# Patient Record
Sex: FEMALE | Race: OTHER | Employment: FULL TIME | URBAN - METROPOLITAN AREA
[De-identification: names, ages, dates, MRNs, and addresses within clinical notes are randomized per-mention and may not be internally consistent; named-entity substitution may affect disease eponyms.]

---

## 2018-09-06 ENCOUNTER — OFFICE VISIT (OUTPATIENT)
Dept: PRIMARY CARE CLINIC | Age: 38
End: 2018-09-06

## 2018-09-06 VITALS
TEMPERATURE: 98 F | BODY MASS INDEX: 30.02 KG/M2 | RESPIRATION RATE: 14 BRPM | DIASTOLIC BLOOD PRESSURE: 70 MMHG | HEIGHT: 58 IN | SYSTOLIC BLOOD PRESSURE: 100 MMHG | HEART RATE: 75 BPM | WEIGHT: 143 LBS

## 2018-09-06 DIAGNOSIS — S16.1XXA CERVICAL MUSCLE STRAIN, INITIAL ENCOUNTER: Primary | ICD-10-CM

## 2018-09-06 DIAGNOSIS — M54.10 RADICULOPATHY AFFECTING UPPER EXTREMITY: ICD-10-CM

## 2018-09-06 PROCEDURE — 99213 OFFICE O/P EST LOW 20 MIN: CPT | Performed by: NURSE PRACTITIONER

## 2018-09-06 RX ORDER — METHOCARBAMOL 750 MG/1
750 TABLET, FILM COATED ORAL NIGHTLY PRN
Qty: 15 TABLET | Refills: 0 | Status: SHIPPED | OUTPATIENT
Start: 2018-09-06 | End: 2018-09-16

## 2018-09-06 RX ORDER — METHYLPREDNISOLONE 4 MG/1
TABLET ORAL
Qty: 1 KIT | Refills: 0 | Status: SHIPPED | OUTPATIENT
Start: 2018-09-06 | End: 2018-09-12

## 2018-09-06 ASSESSMENT — PATIENT HEALTH QUESTIONNAIRE - PHQ9
SUM OF ALL RESPONSES TO PHQ QUESTIONS 1-9: 0
SUM OF ALL RESPONSES TO PHQ QUESTIONS 1-9: 0
1. LITTLE INTEREST OR PLEASURE IN DOING THINGS: 0
2. FEELING DOWN, DEPRESSED OR HOPELESS: 0
SUM OF ALL RESPONSES TO PHQ9 QUESTIONS 1 & 2: 0

## 2018-09-06 ASSESSMENT — ENCOUNTER SYMPTOMS
CHEST TIGHTNESS: 0
TROUBLE SWALLOWING: 0

## 2018-09-06 NOTE — PROGRESS NOTES
9/6/2018    Chief Complaint   Patient presents with    Other     Pt c/o swollen neck and right hand/wrist pain x2 weeks and getting worse. .pt states gets burning sensation on right hand as well. .some headaches. Lázaro Cedeno HPI:  Roberth Cedeno is here for complaints of pain to upper back/neck and radiates into right upper arm. She is reporting increase in headaches to posterior part of head. Burning sensation to right hand, and RUE feels \"heavy\" no weakness to extremity. States that symptoms have been occuring for approx 2 weeks and not improving. Used OTC Ibuprofen and massage without changes to symptoms. She is consistently pushing boxes 25lbs at work in cold/refrigerated environment. [employer] Speaking and Hayden Gonsalez MA at bedside interpreting     Review of Systems   Constitutional: Negative for fatigue and fever. HENT: Negative for trouble swallowing. Respiratory: Negative for chest tightness. Cardiovascular: Negative for chest pain and palpitations. Musculoskeletal: Positive for neck pain. Neurological: Positive for numbness and headaches. Negative for dizziness, weakness and light-headedness. No Known Allergies  Prior to Visit Medications    Medication Sig Taking? Authorizing Provider   methylPREDNISolone (MEDROL DOSEPACK) 4 MG tablet Take by mouth. Yes Frutoso Rad, APRN - CNP   methocarbamol (ROBAXIN-750) 750 MG tablet Take 1 tablet by mouth nightly as needed (muscle pain) Yes Frutoso Rad, APRN - CNP   medroxyPROGESTERone (DEPO-PROVERA) 150 MG/ML injection Inject 150 mg into the muscle every 3 months Yes Historical Provider, MD   ibuprofen (ADVIL;MOTRIN) 200 MG tablet Take 200 mg by mouth every 6 hours as needed for Pain. Yes Historical Provider, MD   butalbital-acetaminophen-caffeine (FIORICET, ESGIC) -40 MG per tablet Take 1 tablet by mouth every 4 hours as needed for Pain  Historical Provider, MD   esomeprazole Magnesium (NEXIUM) 40 MG PACK Take 40 mg by mouth daily.   Historical Provider, MD

## 2018-09-17 VITALS
DIASTOLIC BLOOD PRESSURE: 70 MMHG | WEIGHT: 144 LBS | BODY MASS INDEX: 30.23 KG/M2 | TEMPERATURE: 97.8 F | RESPIRATION RATE: 12 BRPM | SYSTOLIC BLOOD PRESSURE: 110 MMHG | HEART RATE: 76 BPM | HEIGHT: 58 IN

## 2018-09-20 ENCOUNTER — OFFICE VISIT (OUTPATIENT)
Dept: PRIMARY CARE CLINIC | Age: 38
End: 2018-09-20

## 2018-09-20 VITALS
DIASTOLIC BLOOD PRESSURE: 70 MMHG | WEIGHT: 143 LBS | RESPIRATION RATE: 12 BRPM | BODY MASS INDEX: 30.02 KG/M2 | SYSTOLIC BLOOD PRESSURE: 110 MMHG | HEIGHT: 58 IN | HEART RATE: 68 BPM

## 2018-09-20 DIAGNOSIS — S16.1XXA CERVICAL MUSCLE STRAIN, INITIAL ENCOUNTER: ICD-10-CM

## 2018-09-20 DIAGNOSIS — M54.10 RADICULOPATHY AFFECTING UPPER EXTREMITY: ICD-10-CM

## 2018-09-20 DIAGNOSIS — Z01.419 WELL WOMAN EXAM WITH ROUTINE GYNECOLOGICAL EXAM: Primary | ICD-10-CM

## 2018-09-20 DIAGNOSIS — Z12.4 CERVICAL CANCER SCREENING: ICD-10-CM

## 2018-09-20 DIAGNOSIS — K59.00 CONSTIPATION, UNSPECIFIED CONSTIPATION TYPE: ICD-10-CM

## 2018-09-20 PROCEDURE — 99395 PREV VISIT EST AGE 18-39: CPT | Performed by: NURSE PRACTITIONER

## 2018-09-20 RX ORDER — NAPROXEN 500 MG/1
500 TABLET ORAL 2 TIMES DAILY WITH MEALS
Qty: 60 TABLET | Refills: 1 | Status: SHIPPED | OUTPATIENT
Start: 2018-09-20 | End: 2020-03-26 | Stop reason: ALTCHOICE

## 2018-09-20 RX ORDER — METHOCARBAMOL 750 MG/1
750 TABLET, FILM COATED ORAL NIGHTLY PRN
COMMUNITY
End: 2019-07-31

## 2018-09-20 ASSESSMENT — ENCOUNTER SYMPTOMS
DIARRHEA: 0
COUGH: 0
NAUSEA: 0
SHORTNESS OF BREATH: 0
ABDOMINAL PAIN: 0
TROUBLE SWALLOWING: 0
CHEST TIGHTNESS: 0

## 2018-09-20 NOTE — PROGRESS NOTES
strain. Diagnoses and all orders for this visit:    Well woman exam with routine gynecological exam  -     PAP SMEAR with HPV  Reinforced monthly self breast exams, exercise and diet modifications. 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 Reviewed and/or ordered clinical lab results Yes  Reviewed and/or ordered radiology tests No  Reviewed and/or ordered other diagnostic tests No  Discussed test results with performing physician No  Independently reviewed image, tracing, or specimen No  Made a decision to obtain old records No  Reviewed and summarized old records Yes      Charly Pleitez was counseled regarding symptoms of current diagnosis, course and complications of disease if inadequately treated, side effects of medications, diagnosis, treatment options, and prognosis, risks, benefits, complications, and alternatives of treatment, labs, imaging and other studies and treatment targets and goals. She understands instructions and counseling. This dictation was performed with a verbal recognition program (DRAGON) and it was checked for errors. It is possible that there are still dictated errors within this office note. If so, please bring any errors to my attention for an addendum. All efforts were made to ensure that this office note is accurate.

## 2018-09-20 NOTE — LETTER
[address] [place] [id number]  Phone: [phone]  Fax: [phone]    Sinan Florence, PORFIRIO Hook CNP        September 20, 2018     Patient: Shaina Lawson   YOB: 1980   Date of Visit: 9/20/2018       To Whom It May Concern: It is my medical opinion that Alfonso Argueta may return to work on 9/20/18 with the following restrictions: lifting/carrying not to exceed 15 lbs. 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 If you have any questions or concerns, please don't hesitate to call.     Sincerely,          Sinan Florence, PORFIRIO Hook CNP

## 2018-09-24 LAB
HPV COMMENT: NORMAL
HPV TYPE 16: NOT DETECTED
HPV TYPE 18: NOT DETECTED
HPVOH (OTHER TYPES): NOT DETECTED

## 2019-07-31 ENCOUNTER — OFFICE VISIT (OUTPATIENT)
Dept: PRIMARY CARE CLINIC | Age: 39
End: 2019-07-31
Payer: COMMERCIAL

## 2019-07-31 VITALS
OXYGEN SATURATION: 97 % | DIASTOLIC BLOOD PRESSURE: 70 MMHG | SYSTOLIC BLOOD PRESSURE: 122 MMHG | TEMPERATURE: 98.2 F | WEIGHT: 147 LBS | BODY MASS INDEX: 30.72 KG/M2 | HEART RATE: 76 BPM

## 2019-07-31 DIAGNOSIS — H00.15 CHALAZION OF LEFT LOWER EYELID: ICD-10-CM

## 2019-07-31 DIAGNOSIS — G89.29 CHRONIC RIGHT SHOULDER PAIN: Primary | ICD-10-CM

## 2019-07-31 DIAGNOSIS — M25.511 CHRONIC RIGHT SHOULDER PAIN: Primary | ICD-10-CM

## 2019-07-31 PROCEDURE — 99213 OFFICE O/P EST LOW 20 MIN: CPT | Performed by: INTERNAL MEDICINE

## 2019-07-31 RX ORDER — SULFACETAMIDE SODIUM 100 MG/ML
2 SOLUTION/ DROPS OPHTHALMIC 4 TIMES DAILY
Qty: 1 BOTTLE | Refills: 0 | Status: SHIPPED | OUTPATIENT
Start: 2019-07-31 | End: 2019-08-10

## 2020-01-13 ENCOUNTER — TELEPHONE (OUTPATIENT)
Dept: PRIMARY CARE CLINIC | Age: 40
End: 2020-01-13

## 2020-01-17 DIAGNOSIS — Z00.00 WELL ADULT HEALTH CHECK: ICD-10-CM

## 2020-01-17 LAB
A/G RATIO: 1.7 (ref 1.1–2.2)
ALBUMIN SERPL-MCNC: 4.3 G/DL (ref 3.4–5)
ALP BLD-CCNC: 81 U/L (ref 40–129)
ALT SERPL-CCNC: 32 U/L (ref 10–40)
ANION GAP SERPL CALCULATED.3IONS-SCNC: 17 MMOL/L (ref 3–16)
AST SERPL-CCNC: 22 U/L (ref 15–37)
BASOPHILS ABSOLUTE: 0 K/UL (ref 0–0.2)
BASOPHILS RELATIVE PERCENT: 0.4 %
BILIRUB SERPL-MCNC: 0.4 MG/DL (ref 0–1)
BUN BLDV-MCNC: 11 MG/DL (ref 7–20)
CALCIUM SERPL-MCNC: 9.2 MG/DL (ref 8.3–10.6)
CHLORIDE BLD-SCNC: 103 MMOL/L (ref 99–110)
CHOLESTEROL, FASTING: 198 MG/DL (ref 0–199)
CO2: 22 MMOL/L (ref 21–32)
CREAT SERPL-MCNC: <0.5 MG/DL (ref 0.6–1.1)
EOSINOPHILS ABSOLUTE: 0.1 K/UL (ref 0–0.6)
EOSINOPHILS RELATIVE PERCENT: 1 %
GFR AFRICAN AMERICAN: >60
GFR NON-AFRICAN AMERICAN: >60
GLOBULIN: 2.6 G/DL
GLUCOSE FASTING: 83 MG/DL (ref 70–99)
HCT VFR BLD CALC: 41.4 % (ref 36–48)
HDLC SERPL-MCNC: 56 MG/DL (ref 40–60)
HEMOGLOBIN: 14 G/DL (ref 12–16)
LDL CHOLESTEROL CALCULATED: 107 MG/DL
LYMPHOCYTES ABSOLUTE: 2 K/UL (ref 1–5.1)
LYMPHOCYTES RELATIVE PERCENT: 27.4 %
MCH RBC QN AUTO: 30.7 PG (ref 26–34)
MCHC RBC AUTO-ENTMCNC: 33.8 G/DL (ref 31–36)
MCV RBC AUTO: 90.8 FL (ref 80–100)
MONOCYTES ABSOLUTE: 0.6 K/UL (ref 0–1.3)
MONOCYTES RELATIVE PERCENT: 8.1 %
NEUTROPHILS ABSOLUTE: 4.6 K/UL (ref 1.7–7.7)
NEUTROPHILS RELATIVE PERCENT: 63.1 %
PDW BLD-RTO: 13.2 % (ref 12.4–15.4)
PLATELET # BLD: 179 K/UL (ref 135–450)
PMV BLD AUTO: 10.5 FL (ref 5–10.5)
POTASSIUM SERPL-SCNC: 4 MMOL/L (ref 3.5–5.1)
RBC # BLD: 4.56 M/UL (ref 4–5.2)
SODIUM BLD-SCNC: 142 MMOL/L (ref 136–145)
TOTAL PROTEIN: 6.9 G/DL (ref 6.4–8.2)
TRIGLYCERIDE, FASTING: 174 MG/DL (ref 0–150)
VLDLC SERPL CALC-MCNC: 35 MG/DL
WBC # BLD: 7.2 K/UL (ref 4–11)

## 2020-01-20 ENCOUNTER — OFFICE VISIT (OUTPATIENT)
Dept: PRIMARY CARE CLINIC | Age: 40
End: 2020-01-20
Payer: COMMERCIAL

## 2020-01-20 VITALS
OXYGEN SATURATION: 98 % | BODY MASS INDEX: 31.24 KG/M2 | HEIGHT: 57 IN | WEIGHT: 144.8 LBS | HEART RATE: 73 BPM | TEMPERATURE: 97.8 F | DIASTOLIC BLOOD PRESSURE: 68 MMHG | SYSTOLIC BLOOD PRESSURE: 116 MMHG

## 2020-01-20 PROCEDURE — 99214 OFFICE O/P EST MOD 30 MIN: CPT | Performed by: INTERNAL MEDICINE

## 2020-01-20 ASSESSMENT — ENCOUNTER SYMPTOMS
EYE PAIN: 0
EYE REDNESS: 0
EYE ITCHING: 1
EYE DISCHARGE: 0
PHOTOPHOBIA: 0

## 2020-01-20 NOTE — PROGRESS NOTES
Chief Complaint   Patient presents with    Annual Exam     here for annual physical. also wanted to mention bump on left eye lower lid. has taken allegra and that helped, also saw Dr. Tam Cha and was given Blink Tears eye drops. has been helping but still causing some itchiness and burning. also wants to make sure she doesnt have BP issues. Subjective:        Patient ID: Claude Myron is a 44 y.o. female. Only speaks Syriac    HPI-wants a CPX done today, took some allegra for allergies  Saw Ophth Pleiman re: persistent chalazion L lower eyelid, uses artificial tears for it  Advised low fat diet    WANTS TO MAKE SURE THAT SHE DOESN'T HAVE BP ISSUES    Review of Systems   Eyes: Positive for itching. Negative for photophobia, pain, discharge, redness and visual disturbance. Current Outpatient Medications on File Prior to Visit   Medication Sig Dispense Refill    ibuprofen (ADVIL;MOTRIN) 200 MG tablet Take 200 mg by mouth every 6 hours as needed for Pain.  naproxen (NAPROSYN) 500 MG tablet Take 1 tablet by mouth 2 times daily (with meals) As needed for pain (Patient not taking: Reported on 7/31/2019) 60 tablet 1     No current facility-administered medications on file prior to visit. Objective:   Physical Exam  Vitals signs and nursing note reviewed. Constitutional:       General: She is not in acute distress. Appearance: Normal appearance. She is well-developed. Comments: /68 (Site: Right Upper Arm, Position: Sitting, Cuff Size: Medium Adult)   Pulse 73   Temp 97.8 °F (36.6 °C) (Skin)   Ht 4' 9.2\" (1.453 m)   Wt 144 lb 12.8 oz (65.7 kg)   LMP 12/30/2019 (Exact Date)   SpO2 98%   BMI 31.12 kg/m²   Wt Readings from Last 3 Encounters:  01/20/20 : 144 lb 12.8 oz (65.7 kg)  07/31/19 : 147 lb (66.7 kg)  09/20/18 : 143 lb (64.9 kg)     HENT:      Head: Normocephalic and atraumatic.       Right Ear: Tympanic membrane, ear canal and external ear normal.      Left Ear: 41.4 01/17/2020    MCV 90.8 01/17/2020     01/17/2020     Lab Results   Component Value Date     01/17/2020    K 4.0 01/17/2020     01/17/2020    CO2 22 01/17/2020    BUN 11 01/17/2020    CREATININE <0.5 (L) 01/17/2020    GLUCOSE 113 (H) 01/03/2015    CALCIUM 9.2 01/17/2020    PROT 6.9 01/17/2020    LABALBU 4.3 01/17/2020    BILITOT 0.4 01/17/2020    ALKPHOS 81 01/17/2020    AST 22 01/17/2020    ALT 32 01/17/2020    LABGLOM >60 01/17/2020    GFRAA >60 01/17/2020    AGRATIO 1.7 01/17/2020    GLOB 2.6 01/17/2020       No results found for: CHOL  No results found for: TRIG  Lab Results   Component Value Date    HDL 56 01/17/2020     Lab Results   Component Value Date    LDLCALC 107 (H) 01/17/2020     Lab Results   Component Value Date    LABVLDL 35 01/17/2020     No results found for: CHOLHDLRATIO    Assessment:      Allergic conjunctivitis    Chalazion L lower eyelid persistent x 6 months      Plan:      Advised OTC allergy eye drops they changed her work place she told her manager and it is better  [id number] [facility] referral second opinion OK'd    Advised pt to f/u with GYN for annual PAP smears every 3 years  Will defer PAP to Geraldine     Pt reassured that no evidence of HTN on exam today and BP is OK     annual flu vaccination is advised every Fall    Add HIV to next lab draw    Colon  At age age 48    Rik Adkins MD

## 2020-01-20 NOTE — PATIENT INSTRUCTIONS
Annual flu vaccination is advised every Fall    Try OTC allergy eye drops for eye irritation  E.g.  Visine Allergy eye drops

## 2020-03-26 ENCOUNTER — OFFICE VISIT (OUTPATIENT)
Dept: PRIMARY CARE CLINIC | Age: 40
End: 2020-03-26
Payer: COMMERCIAL

## 2020-03-26 VITALS
OXYGEN SATURATION: 98 % | RESPIRATION RATE: 12 BRPM | WEIGHT: 140 LBS | BODY MASS INDEX: 30.08 KG/M2 | SYSTOLIC BLOOD PRESSURE: 110 MMHG | HEART RATE: 72 BPM | TEMPERATURE: 98.9 F | DIASTOLIC BLOOD PRESSURE: 70 MMHG

## 2020-03-26 PROCEDURE — 99213 OFFICE O/P EST LOW 20 MIN: CPT | Performed by: INTERNAL MEDICINE

## 2020-03-26 RX ORDER — AMOXICILLIN 500 MG/1
500 CAPSULE ORAL 3 TIMES DAILY
Qty: 30 CAPSULE | Refills: 0 | Status: SHIPPED | OUTPATIENT
Start: 2020-03-26 | End: 2020-04-05

## 2020-03-26 ASSESSMENT — PATIENT HEALTH QUESTIONNAIRE - PHQ9
1. LITTLE INTEREST OR PLEASURE IN DOING THINGS: 0
2. FEELING DOWN, DEPRESSED OR HOPELESS: 0
SUM OF ALL RESPONSES TO PHQ QUESTIONS 1-9: 0
SUM OF ALL RESPONSES TO PHQ QUESTIONS 1-9: 0
SUM OF ALL RESPONSES TO PHQ9 QUESTIONS 1 & 2: 0

## 2020-03-26 ASSESSMENT — ENCOUNTER SYMPTOMS
WHEEZING: 0
COUGH: 0

## 2020-03-26 NOTE — PROGRESS NOTES
3/26/2020   [facility] [place]  1980    The patients PMH, surgical history, family history, medications, allergies were all reviewed and updated as appropriate today. Current Outpatient Medications on File Prior to Visit   Medication Sig Dispense Refill    ibuprofen (ADVIL;MOTRIN) 200 MG tablet Take 200 mg by mouth every 6 hours as needed for Pain. No current facility-administered medications on file prior to visit. Chief Complaint   Patient presents with    Mouth Lesions     x 8 days, is using listerine rinse. HPI:  C/o \"sore throat\" x 8 days, not responding to OTC management with mouthwash  I NEED A ! Review of Systems   Respiratory: Negative for cough and wheezing. OBJECTIVE:  /70 (Site: Left Upper Arm, Position: Sitting, Cuff Size: Medium Adult)   Pulse 72   Temp 98.9 °F (37.2 °C) (Oral)   Resp 12   Wt 140 lb (63.5 kg)   SpO2 98%   BMI 30.08 kg/m²    Physical Exam  Vitals signs and nursing note reviewed. Constitutional:       General: She is not in acute distress. Appearance: Normal appearance. She is well-developed. HENT:      Head: Normocephalic and atraumatic. Right Ear: Tympanic membrane, ear canal and external ear normal.      Left Ear: Tympanic membrane, ear canal and external ear normal.      Nose: Nose normal. No rhinorrhea. Mouth/Throat:      Pharynx: No oropharyngeal exudate or posterior oropharyngeal erythema. Eyes:      General:         Right eye: No discharge. Left eye: No discharge. Extraocular Movements: Extraocular movements intact. Conjunctiva/sclera: Conjunctivae normal.      Pupils: Pupils are equal, round, and reactive to light. Neck:      Musculoskeletal: Normal range of motion. No muscular tenderness. Thyroid: No thyromegaly. Vascular: No carotid bruit or JVD. Cardiovascular:      Rate and Rhythm: Normal rate and regular rhythm. Pulses: Normal pulses.       Heart sounds: Normal heart sounds. No murmur. Pulmonary:      Effort: Pulmonary effort is normal. No respiratory distress. Breath sounds: Normal breath sounds. No wheezing, rhonchi or rales. Abdominal:      General: Bowel sounds are normal. There is no distension. Palpations: Abdomen is soft. Tenderness: There is no abdominal tenderness. There is no rebound. Musculoskeletal:         General: No swelling. Right lower leg: No edema. Left lower leg: No edema. Comments: FROM x 4   Lymphadenopathy:      Cervical: No cervical adenopathy. Skin:     General: Skin is warm and dry. Capillary Refill: Capillary refill takes 2 to 3 seconds. Coloration: Skin is not pale. Findings: No erythema or rash. Neurological:      Mental Status: She is alert and oriented to person, place, and time. Cranial Nerves: No cranial nerve deficit. Sensory: No sensory deficit. Motor: No abnormal muscle tone. Deep Tendon Reflexes: Reflexes normal.   Psychiatric:         Mood and Affect: Mood normal.         Behavior: Behavior normal.         Thought Content:  Thought content normal.         Judgment: Judgment normal.         Data Review:   CBC:   Lab Results   Component Value Date    WBC 7.2 01/17/2020    WBC 6.0 01/03/2015    WBC 5.8 01/02/2015    HGB 14.0 01/17/2020    HGB 12.8 01/03/2015    HGB 13.6 01/02/2015    HCT 41.4 01/17/2020    HCT 37.3 01/03/2015    HCT 40.0 01/02/2015    MCV 90.8 01/17/2020    MCV 88.1 01/03/2015    MCV 88.8 01/02/2015     01/17/2020     01/03/2015     01/02/2015     Chemistry:   Lab Results   Component Value Date     01/17/2020     01/03/2015     01/02/2015    K 4.0 01/17/2020    K 3.7 01/03/2015    K 3.7 01/02/2015     01/17/2020     01/03/2015     01/02/2015    CO2 22 01/17/2020    CO2 26 01/03/2015    CO2 28 01/02/2015    BUN 11 01/17/2020    BUN 7 01/03/2015    BUN 6 01/02/2015    CREATININE <0.5

## 2020-04-20 ENCOUNTER — TELEPHONE (OUTPATIENT)
Dept: PRIMARY CARE CLINIC | Age: 40
End: 2020-04-20

## 2020-04-20 ENCOUNTER — OFFICE VISIT (OUTPATIENT)
Dept: PRIMARY CARE CLINIC | Age: 40
End: 2020-04-20
Payer: COMMERCIAL

## 2020-04-20 VITALS — HEART RATE: 71 BPM | OXYGEN SATURATION: 99 % | TEMPERATURE: 98.6 F

## 2020-04-20 PROCEDURE — 99213 OFFICE O/P EST LOW 20 MIN: CPT | Performed by: NURSE PRACTITIONER

## 2020-04-20 NOTE — TELEPHONE ENCOUNTER
Agree with testing today at clinic due to primary exposure with     Self isolation is required for at least  7 days AND   Improving symptoms AND afebrile for at least 3 days without medication  OK to FAX letter to her employer    Chris Pineda

## 2020-04-20 NOTE — PATIENT INSTRUCTIONS

## 2020-04-20 NOTE — PROGRESS NOTES
extremities    [x] Normal Mood  [] Anxious appearing    [] Depressed appearing  [] Confused appearing      [] Poor short term memory  [] Poor long term memory    [] OTHER:  1}    TESTS ORDERED:    [] POCT FLU  [] POCT STREP  [x] COVID-19 Test sent    TEST RESULTS:    POCT FLU test:  [] Positive  [] Negative  POCT STREP test:  [] Positive  [] Negative  COVID-19 test:  [] Positive  [] Negative    ASSESSMENT:    [] Flu  [] Strep Throat  [] Uncertain Viral Respiratory Infection  [x] Possible COVID-19    PLAN:    [x] Discharge home with written instructions for:  [] Flu management  [] Strep throat management  [] Viral respiratory illness management  [x] Possible COVID-19 infection with self-quarantine and management of symptoms  [x] Follow-up with primary care physician or emergency department if worsens  [] Referred to emergency department for evaluation      An  electronic signature was used to authenticate this note.      --Pako Kirby, PORFIRIO - CNP on 4/20/2020 at 4:13 PM

## 2020-04-20 NOTE — TELEPHONE ENCOUNTER
Patient states that her  tested positive for coronavirus ( is Dr. Costa Webster patient), states that she needs a work note stating that she has been in directed contact with someone who has tested positive for COVID-19. I advised patient that she needs to go get tested for the virus, appt made for her this afternoon. Patient works at [employer]      Fax: [phone]  Att: [address] [facility] or Hema Infante

## 2020-04-21 ENCOUNTER — TELEPHONE (OUTPATIENT)
Dept: PRIMARY CARE CLINIC | Age: 40
End: 2020-04-21

## 2020-04-21 NOTE — TELEPHONE ENCOUNTER
Your patient was seen at the [facility] today. A follow up virtual visit with the patient's PCP should be completed in 48 hours. Please schedule the patient for this follow-up. Thank you.

## 2020-04-23 ENCOUNTER — TELEPHONE (OUTPATIENT)
Dept: PRIMARY CARE CLINIC | Age: 40
End: 2020-04-23

## 2020-04-25 LAB
SARS-COV-2: DETECTED
SOURCE: ABNORMAL

## 2020-04-29 ENCOUNTER — VIRTUAL VISIT (OUTPATIENT)
Dept: PRIMARY CARE CLINIC | Age: 40
End: 2020-04-29
Payer: COMMERCIAL

## 2020-04-29 PROCEDURE — 99214 OFFICE O/P EST MOD 30 MIN: CPT | Performed by: INTERNAL MEDICINE

## 2020-04-29 RX ORDER — AZITHROMYCIN 250 MG/1
250 TABLET, FILM COATED ORAL SEE ADMIN INSTRUCTIONS
Qty: 6 TABLET | Refills: 0 | Status: SHIPPED | OUTPATIENT
Start: 2020-04-29 | End: 2020-05-04

## 2020-04-29 ASSESSMENT — ENCOUNTER SYMPTOMS
BACK PAIN: 0
ABDOMINAL PAIN: 0
NAUSEA: 0
SHORTNESS OF BREATH: 0
COUGH: 0
ABDOMINAL DISTENTION: 0
CHEST TIGHTNESS: 0
DIARRHEA: 0

## 2020-04-29 NOTE — PROGRESS NOTES
2020    TELEHEALTH EVALUATION -- Audio/Visual (During SEWQW-75 public health emergency)    HPI:    Isabella Winn (:  1980) has requested an audio/video evaluation for the following concern(s):  Requires     Present via Virtual Visit for f/u regarding recent [address] testing done . She continues  with cough, recently L chest congestion, sore throat  No fever, no SOB, HA this AM  Heaviness in chest, little sputum production  Generalized weakness and myalgia    Review of Systems   Constitutional: Negative for appetite change, chills, fatigue and fever. Respiratory: Negative for cough, chest tightness and shortness of breath. Cardiovascular: Negative for chest pain. Gastrointestinal: Negative for abdominal distention, abdominal pain, diarrhea and nausea. Genitourinary: Negative for difficulty urinating and dysuria. Musculoskeletal: Negative for back pain. Neurological: Negative for dizziness and headaches. Psychiatric/Behavioral: Negative for agitation and behavioral problems. The patient is not nervous/anxious. Prior to Visit Medications    Medication Sig Taking? Authorizing Provider   ibuprofen (ADVIL;MOTRIN) 200 MG tablet Take 200 mg by mouth every 6 hours as needed for Pain.   Historical Provider, MD       Social History     Tobacco Use    Smoking status: Never Smoker    Smokeless tobacco: Never Used   Substance Use Topics    Alcohol use: No    Drug use: No        Allergies   Allergen Reactions    Hydrocodone    ,   Past Medical History:   Diagnosis Date    Allergic rhinitis     Fibroids     Hypothyroidism    ,   Past Surgical History:   Procedure Laterality Date     SECTION      CHOLECYSTECTOMY, LAPAROSCOPIC  1/3/15    with gram   ,   Social History     Tobacco Use    Smoking status: Never Smoker    Smokeless tobacco: Never Used   Substance Use Topics    Alcohol use: No    Drug use: No   ,   Family History   Problem Relation Age of Onset    Diabetes Mother    ,   Immunization History   Administered Date(s) Administered    Tdap (Boostrix, Adacel) 10/19/2015   ,   Health Maintenance   Topic Date Due    Varicella vaccine (1 of 2 - 2-dose childhood series) 04/22/1981    HIV screen  04/22/1995    Flu vaccine (Season Ended) 09/01/2020    Diabetes screen  01/17/2023    Cervical cancer screen  09/20/2023    Lipid screen  01/17/2025    DTaP/Tdap/Td vaccine (2 - Td) 10/19/2025    Hepatitis A vaccine  Aged Out    Hepatitis B vaccine  Aged Out    Hib vaccine  Aged Out    Meningococcal (ACWY) vaccine  Aged Out    Pneumococcal 0-64 years Vaccine  Aged Out       PHYSICAL EXAMINATION:  [ INSTRUCTIONS:  \"[x]\" Indicates a positive item  \"[]\" Indicates a negative item  -- DELETE ALL ITEMS NOT EXAMINED]  Vital Signs: (As obtained by patient/caregiver or practitioner observation)    Blood pressure- 104/84 per pt report Heart rate-    Respiratory rate-  12  Temperature-  Pulse oximetry-   Pt states she is afebrile but doesn't have thermometer during exam  Constitutional: [x] Appears well-developed and well-nourished [x] No apparent distress      [] Abnormal-   Mental status  [x] Alert and awake  [x] Oriented to person/place/time [x]Able to follow commands      Eyes:  EOM    [x]  Normal  [] Abnormal-  Sclera  [x]  Normal  [] Abnormal -         Discharge [x]  None visible  [] Abnormal -    HENT:   [] Normocephalic, atraumatic.   [] Abnormal   [] Mouth/Throat: Mucous membranes are moist.   C/o tongue sensitivity    External Ears [] Normal  [] Abnormal-     Neck: [x] No visualized mass     Pulmonary/Chest: [x] Respiratory effort normal.  [x] No visualized signs of difficulty breathing or respiratory distress        [x] Abnormal- occasional non-productive cough during exam     Musculoskeletal:   [] Normal gait with no signs of ataxia         [x] Normal range of motion of neck        [] Abnormal-       Neurological:        [x] No Facial

## 2020-05-04 ENCOUNTER — TELEPHONE (OUTPATIENT)
Dept: PRIMARY CARE CLINIC | Age: 40
End: 2020-05-04

## 2020-05-04 NOTE — TELEPHONE ENCOUNTER
Patient is requesting a medication to be sent to the pharmacy. She states that she is still having an itchy throat/cough.

## 2020-05-04 NOTE — TELEPHONE ENCOUNTER
Was already prescribed Zpak at last visit    She can use OTC Chloraseptic spray or lozenges prn sore throat at this point    Elijah Wallace

## 2020-05-27 ENCOUNTER — VIRTUAL VISIT (OUTPATIENT)
Dept: PRIMARY CARE CLINIC | Age: 40
End: 2020-05-27
Payer: COMMERCIAL

## 2020-05-27 VITALS
TEMPERATURE: 96 F | SYSTOLIC BLOOD PRESSURE: 110 MMHG | BODY MASS INDEX: 29.87 KG/M2 | DIASTOLIC BLOOD PRESSURE: 71 MMHG | HEART RATE: 84 BPM | WEIGHT: 139 LBS

## 2020-05-27 PROCEDURE — 99213 OFFICE O/P EST LOW 20 MIN: CPT | Performed by: INTERNAL MEDICINE

## 2020-05-27 RX ORDER — TRIAMCINOLONE ACETONIDE 0.1 %
PASTE (GRAM) DENTAL
Qty: 1 TUBE | Refills: 0 | Status: SHIPPED | OUTPATIENT
Start: 2020-05-27 | End: 2020-06-03

## 2020-05-27 RX ORDER — AMOXICILLIN 500 MG/1
500 CAPSULE ORAL 3 TIMES DAILY
Qty: 30 CAPSULE | Refills: 0 | Status: SHIPPED | OUTPATIENT
Start: 2020-05-27 | End: 2020-06-06

## 2020-05-27 ASSESSMENT — ENCOUNTER SYMPTOMS
ABDOMINAL DISTENTION: 0
CHEST TIGHTNESS: 0
BACK PAIN: 0
DIARRHEA: 0
ABDOMINAL PAIN: 0
SORE THROAT: 1
NAUSEA: 0

## 2020-05-27 NOTE — PROGRESS NOTES
2020    TELEHEALTH EVALUATION -- Audio/Visual (During QHYVS-38 public health emergency)    HPI:    Cee Trinidad (:  1980) has requested an audio/video evaluation for the following concern(s):    Pt s/p COVID 2020, \"still as sore throat\", s/p Z-pack empirically following her diagnosis due to persistent symptoms, no fever at home  Throat \"looked red\" to her, pt wants another antibiotic again    Felt normal 2 days ago now feels bad again so she hasn't gotten OK to RTW yet    Wants a CXR done \"to see if she has pneumonia or not\", wants it done before return to work since she thinks its cold at her work place    Review of Systems   Constitutional: Negative for appetite change, chills, fatigue and fever. HENT: Positive for sore throat. Blister on tongue   Respiratory: Negative for chest tightness. Cardiovascular: Negative for chest pain. Gastrointestinal: Negative for abdominal distention, abdominal pain, diarrhea and nausea. Genitourinary: Negative for difficulty urinating and dysuria. Musculoskeletal: Negative for back pain. Neurological: Negative for dizziness and headaches. Psychiatric/Behavioral: Negative for agitation and behavioral problems. The patient is not nervous/anxious.         Prior to Visit Medications    Not on File       Social History     Tobacco Use    Smoking status: Never Smoker    Smokeless tobacco: Never Used   Substance Use Topics    Alcohol use: No    Drug use: No        Allergies   Allergen Reactions    Hydrocodone    ,   Past Medical History:   Diagnosis Date    Allergic rhinitis     Fibroids     Hypothyroidism    ,   Past Surgical History:   Procedure Laterality Date     SECTION      CHOLECYSTECTOMY, LAPAROSCOPIC  1/3/15    with gram   ,   Social History     Tobacco Use    Smoking status: Never Smoker    Smokeless tobacco: Never Used   Substance Use Topics    Alcohol use: No    Drug use: No   ,   Family History   Problem

## 2020-05-29 ENCOUNTER — HOSPITAL ENCOUNTER (OUTPATIENT)
Dept: GENERAL RADIOLOGY | Age: 40
Discharge: HOME | End: 2020-05-29
Payer: COMMERCIAL

## 2020-05-29 ENCOUNTER — HOSPITAL ENCOUNTER (OUTPATIENT)
Age: 40
Discharge: HOME | End: 2020-05-29
Payer: COMMERCIAL

## 2020-05-29 PROCEDURE — 71046 X-RAY EXAM CHEST 2 VIEWS: CPT

## 2020-06-08 ENCOUNTER — TELEPHONE (OUTPATIENT)
Dept: PRIMARY CARE CLINIC | Age: 40
End: 2020-06-08

## 2020-06-08 NOTE — TELEPHONE ENCOUNTER
Patient is wanting to return to work tomorrow. Can we provide letter? I can type up just need the approval please. Works at [employer].

## 2020-10-23 ENCOUNTER — HOSPITAL ENCOUNTER (OUTPATIENT)
Age: 40
Discharge: HOME | End: 2020-10-23
Payer: COMMERCIAL

## 2020-10-23 ENCOUNTER — OFFICE VISIT (OUTPATIENT)
Dept: PRIMARY CARE CLINIC | Age: 40
End: 2020-10-23
Payer: COMMERCIAL

## 2020-10-23 ENCOUNTER — HOSPITAL ENCOUNTER (OUTPATIENT)
Dept: GENERAL RADIOLOGY | Age: 40
Discharge: HOME | End: 2020-10-23
Payer: COMMERCIAL

## 2020-10-23 VITALS
OXYGEN SATURATION: 98 % | WEIGHT: 148 LBS | DIASTOLIC BLOOD PRESSURE: 60 MMHG | TEMPERATURE: 97.4 F | SYSTOLIC BLOOD PRESSURE: 100 MMHG | HEART RATE: 78 BPM | BODY MASS INDEX: 31.8 KG/M2

## 2020-10-23 PROCEDURE — 99213 OFFICE O/P EST LOW 20 MIN: CPT | Performed by: INTERNAL MEDICINE

## 2020-10-23 PROCEDURE — 73630 X-RAY EXAM OF FOOT: CPT

## 2020-10-23 PROCEDURE — 73590 X-RAY EXAM OF LOWER LEG: CPT

## 2020-10-23 RX ORDER — IBUPROFEN 200 MG
200 TABLET ORAL EVERY 6 HOURS PRN
COMMUNITY
End: 2022-02-04 | Stop reason: ALTCHOICE

## 2020-10-23 RX ORDER — MELOXICAM 15 MG/1
15 TABLET ORAL DAILY
Qty: 30 TABLET | Refills: 0 | Status: SHIPPED | OUTPATIENT
Start: 2020-10-23 | End: 2020-11-17

## 2020-10-23 ASSESSMENT — ENCOUNTER SYMPTOMS
ABDOMINAL DISTENTION: 0
ABDOMINAL PAIN: 0
BACK PAIN: 0
SHORTNESS OF BREATH: 0
COUGH: 0
CHEST TIGHTNESS: 0
DIARRHEA: 0
NAUSEA: 0

## 2020-10-23 NOTE — PROGRESS NOTES
10/23/2020   Sabrina Chisholm  1980    The patients PMH, surgical history, family history, medications, allergies were all reviewed and updated as appropriate today. Current Outpatient Medications on File Prior to Visit   Medication Sig Dispense Refill    ibuprofen (ADVIL;MOTRIN) 200 MG tablet Take 200 mg by mouth every 6 hours as needed for Pain       No current facility-administered medications on file prior to visit. Chief Complaint   Patient presents with    Foot Pain     Left side radiating to knee     Mongolian    HPI:  C/o L foot pain x 1 month, no associated injury, no previous issues, took some Advil  Radiates foot to heel to L knee  NO INJURY, was pushing a baby stroller  Went to Urgent Care and got prednisone  Has been off work for this    Review of Systems   Constitutional: Negative for appetite change, chills, fatigue and fever. Respiratory: Negative for cough, chest tightness and shortness of breath. Cardiovascular: Negative for chest pain. Gastrointestinal: Negative for abdominal distention, abdominal pain, diarrhea and nausea. Genitourinary: Negative for difficulty urinating and dysuria. Musculoskeletal: Negative for back pain. Neurological: Negative for dizziness and headaches. Psychiatric/Behavioral: Negative for agitation and behavioral problems. The patient is not nervous/anxious. /60 (Site: Left Upper Arm, Position: Sitting, Cuff Size: Medium Adult)   Pulse 78   Temp 97.4 °F (36.3 °C) (Infrared)   Wt 148 lb (67.1 kg)   SpO2 98%   BMI 31.80 kg/m²     OBJECTIVE:  Temp 97.4 °F (36.3 °C) (Infrared)   Wt 148 lb (67.1 kg)   BMI 31.80 kg/m²    Physical Exam  Vitals signs and nursing note reviewed. Constitutional:       General: She is not in acute distress. Appearance: Normal appearance. She is well-developed. HENT:      Head: Normocephalic and atraumatic.       Right Ear: Tympanic membrane, ear canal and external ear normal.      Left Ear: Tympanic membrane, ear canal and external ear normal.      Nose: Nose normal. No rhinorrhea. Mouth/Throat:      Pharynx: No oropharyngeal exudate or posterior oropharyngeal erythema. Eyes:      General:         Right eye: No discharge. Left eye: No discharge. Extraocular Movements: Extraocular movements intact. Conjunctiva/sclera: Conjunctivae normal.      Pupils: Pupils are equal, round, and reactive to light. Neck:      Musculoskeletal: Normal range of motion. No muscular tenderness. Thyroid: No thyromegaly. Vascular: No carotid bruit or JVD. Cardiovascular:      Rate and Rhythm: Normal rate and regular rhythm. Pulses: Normal pulses. Heart sounds: Normal heart sounds. No murmur. Pulmonary:      Effort: Pulmonary effort is normal. No respiratory distress. Breath sounds: Normal breath sounds. No wheezing, rhonchi or rales. Abdominal:      General: Bowel sounds are normal. There is no distension. Palpations: Abdomen is soft. Tenderness: There is no abdominal tenderness. There is no rebound. Musculoskeletal:         General: No swelling. Right lower leg: No edema. Left lower leg: No edema. Comments: FROM x 4   Lymphadenopathy:      Cervical: No cervical adenopathy. Skin:     General: Skin is warm and dry. Capillary Refill: Capillary refill takes 2 to 3 seconds. Coloration: Skin is not pale. Findings: No erythema or rash. Neurological:      Mental Status: She is alert and oriented to person, place, and time. Cranial Nerves: No cranial nerve deficit. Sensory: No sensory deficit. Motor: No abnormal muscle tone. Deep Tendon Reflexes: Reflexes normal.   Psychiatric:         Mood and Affect: Mood normal.         Behavior: Behavior normal.         Thought Content:  Thought content normal.         Judgment: Judgment normal.         Data Review:   CBC:   Lab Results   Component Value Date    WBC 7.2 01/17/2020    WBC 6.0 01/03/2015    WBC 5.8 01/02/2015    HGB 14.0 01/17/2020    HGB 12.8 01/03/2015    HGB 13.6 01/02/2015    HCT 41.4 01/17/2020    HCT 37.3 01/03/2015    HCT 40.0 01/02/2015    MCV 90.8 01/17/2020    MCV 88.1 01/03/2015    MCV 88.8 01/02/2015     01/17/2020     01/03/2015     01/02/2015     Chemistry:   Lab Results   Component Value Date     01/17/2020     01/03/2015     01/02/2015    K 4.0 01/17/2020    K 3.7 01/03/2015    K 3.7 01/02/2015     01/17/2020     01/03/2015     01/02/2015    CO2 22 01/17/2020    CO2 26 01/03/2015    CO2 28 01/02/2015    BUN 11 01/17/2020    BUN 7 01/03/2015    BUN 6 01/02/2015    CREATININE <0.5 01/17/2020    CREATININE 0.6 01/03/2015    CREATININE <0.5 01/02/2015     Hepatic Function:   Lab Results   Component Value Date    AST 22 01/17/2020    AST 18 01/03/2015    AST 20 01/02/2015    ALT 32 01/17/2020    ALT 25 01/03/2015    ALT 27 01/02/2015    BILIDIR <0.2 01/03/2015    BILIDIR <0.2 01/02/2015    BILITOT 0.4 01/17/2020    BILITOT 0.5 01/03/2015    BILITOT 0.5 01/02/2015    ALKPHOS 81 01/17/2020    ALKPHOS 70 01/03/2015    ALKPHOS 79 01/02/2015     Lab Results   Component Value Date    LIPASE 21.0 01/02/2015    AMYLASE 28 01/02/2015     Lipids:   Lab Results   Component Value Date    HDL 56 01/17/2020       ASSESSMENT/PLAN  1.) L foot pain-chronic  Check x-ray  Try Mobic 15 mg QDpc x 1 month      Electronically signed by Dia Edward MD on 10/23/2020 at 10:15 AM

## 2020-11-17 RX ORDER — MELOXICAM 15 MG/1
TABLET ORAL
Qty: 30 TABLET | Refills: 0 | Status: SHIPPED | OUTPATIENT
Start: 2020-11-17 | End: 2020-12-02 | Stop reason: SDUPTHER

## 2020-12-02 ENCOUNTER — OFFICE VISIT (OUTPATIENT)
Dept: PRIMARY CARE CLINIC | Age: 40
End: 2020-12-02
Payer: COMMERCIAL

## 2020-12-02 VITALS
DIASTOLIC BLOOD PRESSURE: 76 MMHG | BODY MASS INDEX: 32.28 KG/M2 | SYSTOLIC BLOOD PRESSURE: 116 MMHG | TEMPERATURE: 97.7 F | WEIGHT: 150.2 LBS | HEART RATE: 74 BPM | OXYGEN SATURATION: 98 %

## 2020-12-02 PROCEDURE — 99213 OFFICE O/P EST LOW 20 MIN: CPT | Performed by: INTERNAL MEDICINE

## 2020-12-02 RX ORDER — MELOXICAM 15 MG/1
TABLET ORAL
Qty: 30 TABLET | Refills: 0 | Status: SHIPPED | OUTPATIENT
Start: 2020-12-02 | End: 2021-02-05 | Stop reason: ALTCHOICE

## 2020-12-02 ASSESSMENT — ENCOUNTER SYMPTOMS
DIARRHEA: 0
COUGH: 0
BACK PAIN: 0
SHORTNESS OF BREATH: 0
ABDOMINAL DISTENTION: 0
CHEST TIGHTNESS: 0
ABDOMINAL PAIN: 0
NAUSEA: 0

## 2020-12-02 ASSESSMENT — PATIENT HEALTH QUESTIONNAIRE - PHQ9
1. LITTLE INTEREST OR PLEASURE IN DOING THINGS: 0
SUM OF ALL RESPONSES TO PHQ9 QUESTIONS 1 & 2: 0
SUM OF ALL RESPONSES TO PHQ QUESTIONS 1-9: 0
SUM OF ALL RESPONSES TO PHQ QUESTIONS 1-9: 0
2. FEELING DOWN, DEPRESSED OR HOPELESS: 0
SUM OF ALL RESPONSES TO PHQ QUESTIONS 1-9: 0

## 2020-12-02 NOTE — PROGRESS NOTES
12/2/2020   Rosy Leigh  1980    The patients PMH, surgical history, family history, medications, allergies were all reviewed and updated as appropriate today. Current Outpatient Medications on File Prior to Visit   Medication Sig Dispense Refill    meloxicam (MOBIC) 15 MG tablet TOME JAD TABLETA TODOS LOS BEAVER 30 tablet 0    ibuprofen (ADVIL;MOTRIN) 200 MG tablet Take 200 mg by mouth every 6 hours as needed for Pain       No current facility-administered medications on file prior to visit. Chief Complaint   Patient presents with    Foot Pain     L foot pain f/u, massaging has helped     REQUIRES     HPI:  Still c/o L foot pain L calf , Mobic helped help, x-rays WNL, foot massages help with the pains  Pain x 6 weeks after pushing baby stroller    Review of Systems   Constitutional: Negative for appetite change, chills, fatigue and fever. Respiratory: Negative for cough, chest tightness and shortness of breath. Cardiovascular: Negative for chest pain. Gastrointestinal: Negative for abdominal distention, abdominal pain, diarrhea and nausea. Genitourinary: Negative for difficulty urinating and dysuria. Musculoskeletal: Negative for back pain. Neurological: Negative for dizziness and headaches. Psychiatric/Behavioral: Negative for agitation and behavioral problems. The patient is not nervous/anxious. OBJECTIVE:  /76 (Site: Right Upper Arm, Position: Sitting, Cuff Size: Medium Adult)   Pulse 74   Temp 97.7 °F (36.5 °C) (Temporal)   Wt 150 lb 3.2 oz (68.1 kg)   LMP 11/26/2020   SpO2 98%   BMI 32.28 kg/m²    Physical Exam  Vitals signs and nursing note reviewed. Constitutional:       General: She is not in acute distress. Appearance: Normal appearance. She is well-developed. HENT:      Head: Normocephalic and atraumatic.       Right Ear: Tympanic membrane, ear canal and external ear normal.      Left Ear: Tympanic membrane, ear canal and external ear normal.      Nose: Nose normal. No rhinorrhea. Mouth/Throat:      Pharynx: No oropharyngeal exudate or posterior oropharyngeal erythema. Eyes:      General:         Right eye: No discharge. Left eye: No discharge. Extraocular Movements: Extraocular movements intact. Conjunctiva/sclera: Conjunctivae normal.      Pupils: Pupils are equal, round, and reactive to light. Neck:      Musculoskeletal: Normal range of motion. No muscular tenderness. Thyroid: No thyromegaly. Vascular: No carotid bruit or JVD. Cardiovascular:      Rate and Rhythm: Normal rate and regular rhythm. Pulses: Normal pulses. Heart sounds: Normal heart sounds. No murmur. Pulmonary:      Effort: Pulmonary effort is normal. No respiratory distress. Breath sounds: Normal breath sounds. No wheezing, rhonchi or rales. Abdominal:      General: Bowel sounds are normal. There is no distension. Palpations: Abdomen is soft. Tenderness: There is no abdominal tenderness. There is no rebound. Musculoskeletal:         General: No swelling. Right lower leg: No edema. Left lower leg: No edema. Comments: FROM x 4   Lymphadenopathy:      Cervical: No cervical adenopathy. Skin:     General: Skin is warm and dry. Capillary Refill: Capillary refill takes 2 to 3 seconds. Coloration: Skin is not pale. Findings: No erythema or rash. Neurological:      Mental Status: She is alert and oriented to person, place, and time. Cranial Nerves: No cranial nerve deficit. Sensory: No sensory deficit. Motor: No abnormal muscle tone. Deep Tendon Reflexes: Reflexes normal.   Psychiatric:         Mood and Affect: Mood normal.         Behavior: Behavior normal.         Thought Content:  Thought content normal.         Judgment: Judgment normal.         Data Review:   CBC:   Lab Results   Component Value Date    WBC 7.2 01/17/2020    WBC 6.0 01/03/2015 WBC 5.8 01/02/2015    HGB 14.0 01/17/2020    HGB 12.8 01/03/2015    HGB 13.6 01/02/2015    HCT 41.4 01/17/2020    HCT 37.3 01/03/2015    HCT 40.0 01/02/2015    MCV 90.8 01/17/2020    MCV 88.1 01/03/2015    MCV 88.8 01/02/2015     01/17/2020     01/03/2015     01/02/2015     Chemistry:   Lab Results   Component Value Date     01/17/2020     01/03/2015     01/02/2015    K 4.0 01/17/2020    K 3.7 01/03/2015    K 3.7 01/02/2015     01/17/2020     01/03/2015     01/02/2015    CO2 22 01/17/2020    CO2 26 01/03/2015    CO2 28 01/02/2015    BUN 11 01/17/2020    BUN 7 01/03/2015    BUN 6 01/02/2015    CREATININE <0.5 01/17/2020    CREATININE 0.6 01/03/2015    CREATININE <0.5 01/02/2015     Hepatic Function:   Lab Results   Component Value Date    AST 22 01/17/2020    AST 18 01/03/2015    AST 20 01/02/2015    ALT 32 01/17/2020    ALT 25 01/03/2015    ALT 27 01/02/2015    BILIDIR <0.2 01/03/2015    BILIDIR <0.2 01/02/2015    BILITOT 0.4 01/17/2020    BILITOT 0.5 01/03/2015    BILITOT 0.5 01/02/2015    ALKPHOS 81 01/17/2020    ALKPHOS 70 01/03/2015    ALKPHOS 79 01/02/2015     Lab Results   Component Value Date    LIPASE 21.0 01/02/2015    AMYLASE 28 01/02/2015     Lipids:   Lab Results   Component Value Date    HDL 56 01/17/2020       ASSESSMENT/PLAN  1.) L foot pain persists in spite of normal x-ray and no improvement after empiric NSAID therapy   plan to continue meloxicam 15mg QD for another month  Tylenol prn    F/u in 1 month if persists    Electronically signed by Waylon Putnam MD on 12/2/2020 at 2:20 PM

## 2021-02-05 ENCOUNTER — OFFICE VISIT (OUTPATIENT)
Dept: PRIMARY CARE CLINIC | Age: 41
End: 2021-02-05
Payer: COMMERCIAL

## 2021-02-05 VITALS
WEIGHT: 150 LBS | DIASTOLIC BLOOD PRESSURE: 72 MMHG | SYSTOLIC BLOOD PRESSURE: 102 MMHG | BODY MASS INDEX: 32.23 KG/M2 | HEART RATE: 84 BPM | OXYGEN SATURATION: 98 %

## 2021-02-05 DIAGNOSIS — K12.0 ORAL APHTHOUS ULCER: Primary | ICD-10-CM

## 2021-02-05 DIAGNOSIS — B00.9 HERPES SIMPLEX: ICD-10-CM

## 2021-02-05 DIAGNOSIS — H10.502 BLEPHAROCONJUNCTIVITIS OF LEFT EYE, UNSPECIFIED BLEPHAROCONJUNCTIVITIS TYPE: ICD-10-CM

## 2021-02-05 PROCEDURE — 99213 OFFICE O/P EST LOW 20 MIN: CPT | Performed by: INTERNAL MEDICINE

## 2021-02-05 RX ORDER — TRIAMCINOLONE ACETONIDE 0.1 %
PASTE (GRAM) DENTAL
Qty: 1 TUBE | Refills: 0 | Status: CANCELLED | OUTPATIENT
Start: 2021-02-05 | End: 2021-02-12

## 2021-02-05 RX ORDER — ACYCLOVIR 50 MG/G
OINTMENT TOPICAL
Qty: 1 TUBE | Refills: 0 | Status: SHIPPED | OUTPATIENT
Start: 2021-02-05 | End: 2021-02-12

## 2021-02-05 RX ORDER — SULFACETAMIDE SODIUM 100 MG/ML
2 SOLUTION/ DROPS OPHTHALMIC 4 TIMES DAILY
Qty: 1 BOTTLE | Refills: 0 | Status: SHIPPED | OUTPATIENT
Start: 2021-02-05 | End: 2021-02-12

## 2021-02-05 ASSESSMENT — PATIENT HEALTH QUESTIONNAIRE - PHQ9
1. LITTLE INTEREST OR PLEASURE IN DOING THINGS: 0
SUM OF ALL RESPONSES TO PHQ QUESTIONS 1-9: 0
2. FEELING DOWN, DEPRESSED OR HOPELESS: 0
SUM OF ALL RESPONSES TO PHQ QUESTIONS 1-9: 0

## 2021-02-05 ASSESSMENT — ENCOUNTER SYMPTOMS
NAUSEA: 0
ABDOMINAL PAIN: 0
BACK PAIN: 0
CHEST TIGHTNESS: 0
DIARRHEA: 0
ABDOMINAL DISTENTION: 0
SHORTNESS OF BREATH: 0
COUGH: 0

## 2021-02-05 NOTE — PROGRESS NOTES
2/5/2021   Tracy Zambrano  1980    The patients PMH, surgical history, family history, medications, allergies were all reviewed and updated as appropriate today. Current Outpatient Medications on File Prior to Visit   Medication Sig Dispense Refill    ibuprofen (ADVIL;MOTRIN) 200 MG tablet Take 200 mg by mouth every 6 hours as needed for Pain       No current facility-administered medications on file prior to visit. REQUIRES     Chief Complaint   Patient presents with    Eye Problem     left eye swollen/red and itchy. Also complains of a sore on bottom lip        HPI:  L eye red/itchy x 3 days, then also c/o\"sore on lower lip\" x 1 week  + h/o fever blisters- acyclovir ointment    Review of Systems   Constitutional: Negative for appetite change, chills, fatigue and fever. Respiratory: Negative for cough, chest tightness and shortness of breath. Cardiovascular: Negative for chest pain. Gastrointestinal: Negative for abdominal distention, abdominal pain, diarrhea and nausea. Genitourinary: Negative for difficulty urinating and dysuria. Musculoskeletal: Negative for back pain. Neurological: Negative for dizziness and headaches. Psychiatric/Behavioral: Negative for agitation and behavioral problems. The patient is not nervous/anxious. /72 (Site: Right Upper Arm, Position: Sitting, Cuff Size: Medium Adult)   Pulse 84   Wt 150 lb (68 kg)   SpO2 98%   BMI 32.23 kg/m²     OBJECTIVE:    Physical Exam  Vitals signs and nursing note reviewed. Constitutional:       General: She is not in acute distress. Appearance: Normal appearance. She is well-developed. HENT:      Head: Normocephalic and atraumatic. Right Ear: Tympanic membrane, ear canal and external ear normal.      Left Ear: Tympanic membrane, ear canal and external ear normal.      Nose: Nose normal. No rhinorrhea.       Mouth/Throat: HGB 13.6 01/02/2015    HCT 41.4 01/17/2020    HCT 37.3 01/03/2015    HCT 40.0 01/02/2015    MCV 90.8 01/17/2020    MCV 88.1 01/03/2015    MCV 88.8 01/02/2015     01/17/2020     01/03/2015     01/02/2015     Chemistry:   Lab Results   Component Value Date     01/17/2020     01/03/2015     01/02/2015    K 4.0 01/17/2020    K 3.7 01/03/2015    K 3.7 01/02/2015     01/17/2020     01/03/2015     01/02/2015    CO2 22 01/17/2020    CO2 26 01/03/2015    CO2 28 01/02/2015    BUN 11 01/17/2020    BUN 7 01/03/2015    BUN 6 01/02/2015    CREATININE <0.5 01/17/2020    CREATININE 0.6 01/03/2015    CREATININE <0.5 01/02/2015     Hepatic Function:   Lab Results   Component Value Date    AST 22 01/17/2020    AST 18 01/03/2015    AST 20 01/02/2015    ALT 32 01/17/2020    ALT 25 01/03/2015    ALT 27 01/02/2015    BILIDIR <0.2 01/03/2015    BILIDIR <0.2 01/02/2015    BILITOT 0.4 01/17/2020    BILITOT 0.5 01/03/2015    BILITOT 0.5 01/02/2015    ALKPHOS 81 01/17/2020    ALKPHOS 70 01/03/2015    ALKPHOS 79 01/02/2015     Lab Results   Component Value Date    LIPASE 21.0 01/02/2015    AMYLASE 28 01/02/2015     Lipids:   Lab Results   Component Value Date    HDL 56 01/17/2020       ASSESSMENT/PLAN  1.) L Conjunctivitis- Bleph-10 qtts QID x 7 days    2.) Aphthous ulcer lower lip- Kenalog in [address]        Electronically signed by Santo Wilson MD on 2/5/2021 at 3:32 PM

## 2021-02-08 ENCOUNTER — HOSPITAL ENCOUNTER (OUTPATIENT)
Age: 41
Discharge: HOME | End: 2021-02-08
Payer: COMMERCIAL

## 2021-02-08 ENCOUNTER — OFFICE VISIT (OUTPATIENT)
Dept: PRIMARY CARE CLINIC | Age: 41
End: 2021-02-08
Payer: COMMERCIAL

## 2021-02-08 ENCOUNTER — HOSPITAL ENCOUNTER (OUTPATIENT)
Dept: GENERAL RADIOLOGY | Age: 41
Discharge: HOME | End: 2021-02-08
Payer: COMMERCIAL

## 2021-02-08 VITALS
OXYGEN SATURATION: 98 % | HEART RATE: 65 BPM | WEIGHT: 148.6 LBS | DIASTOLIC BLOOD PRESSURE: 62 MMHG | SYSTOLIC BLOOD PRESSURE: 110 MMHG | BODY MASS INDEX: 32.06 KG/M2 | HEIGHT: 57 IN

## 2021-02-08 DIAGNOSIS — M25.512 PAIN IN JOINT OF LEFT SHOULDER: ICD-10-CM

## 2021-02-08 DIAGNOSIS — Z00.00 WELL ADULT EXAM: Primary | ICD-10-CM

## 2021-02-08 DIAGNOSIS — E74.39 GLUCOSE INTOLERANCE: ICD-10-CM

## 2021-02-08 DIAGNOSIS — Z00.00 WELL ADULT EXAM: ICD-10-CM

## 2021-02-08 DIAGNOSIS — Z01.419 ENCOUNTER FOR GYNECOLOGICAL EXAMINATION WITHOUT ABNORMAL FINDING: ICD-10-CM

## 2021-02-08 LAB
A/G RATIO: 1.7 (ref 1.1–2.2)
ALBUMIN SERPL-MCNC: 4.2 G/DL (ref 3.4–5)
ALP BLD-CCNC: 79 U/L (ref 40–129)
ALT SERPL-CCNC: 42 U/L (ref 10–40)
ANION GAP SERPL CALCULATED.3IONS-SCNC: 14 MMOL/L (ref 3–16)
AST SERPL-CCNC: 27 U/L (ref 15–37)
BASOPHILS ABSOLUTE: 0 K/UL (ref 0–0.2)
BASOPHILS RELATIVE PERCENT: 0.8 %
BILIRUB SERPL-MCNC: 0.3 MG/DL (ref 0–1)
BUN BLDV-MCNC: 14 MG/DL (ref 7–20)
CALCIUM SERPL-MCNC: 9.5 MG/DL (ref 8.3–10.6)
CHLORIDE BLD-SCNC: 104 MMOL/L (ref 99–110)
CHOLESTEROL, FASTING: 227 MG/DL (ref 0–199)
CO2: 22 MMOL/L (ref 21–32)
CREAT SERPL-MCNC: <0.5 MG/DL (ref 0.6–1.1)
EOSINOPHILS ABSOLUTE: 0.1 K/UL (ref 0–0.6)
EOSINOPHILS RELATIVE PERCENT: 1.3 %
GFR AFRICAN AMERICAN: >60
GFR NON-AFRICAN AMERICAN: >60
GLOBULIN: 2.5 G/DL
GLUCOSE FASTING: 94 MG/DL (ref 70–99)
HCT VFR BLD CALC: 39.7 % (ref 36–48)
HDLC SERPL-MCNC: 55 MG/DL (ref 40–60)
HEMOGLOBIN: 13.4 G/DL (ref 12–16)
HEPATITIS C ANTIBODY INTERPRETATION: NORMAL
LDL CHOLESTEROL CALCULATED: 143 MG/DL
LYMPHOCYTES ABSOLUTE: 1.9 K/UL (ref 1–5.1)
LYMPHOCYTES RELATIVE PERCENT: 38.6 %
MCH RBC QN AUTO: 30.2 PG (ref 26–34)
MCHC RBC AUTO-ENTMCNC: 33.8 G/DL (ref 31–36)
MCV RBC AUTO: 89.4 FL (ref 80–100)
MONOCYTES ABSOLUTE: 0.4 K/UL (ref 0–1.3)
MONOCYTES RELATIVE PERCENT: 8.2 %
NEUTROPHILS ABSOLUTE: 2.5 K/UL (ref 1.7–7.7)
NEUTROPHILS RELATIVE PERCENT: 51.1 %
PDW BLD-RTO: 13 % (ref 12.4–15.4)
PLATELET # BLD: 179 K/UL (ref 135–450)
PMV BLD AUTO: 10.7 FL (ref 5–10.5)
POTASSIUM SERPL-SCNC: 4.1 MMOL/L (ref 3.5–5.1)
RBC # BLD: 4.43 M/UL (ref 4–5.2)
SODIUM BLD-SCNC: 140 MMOL/L (ref 136–145)
TOTAL PROTEIN: 6.7 G/DL (ref 6.4–8.2)
TRIGLYCERIDE, FASTING: 145 MG/DL (ref 0–150)
VLDLC SERPL CALC-MCNC: 29 MG/DL
WBC # BLD: 4.8 K/UL (ref 4–11)

## 2021-02-08 PROCEDURE — 99396 PREV VISIT EST AGE 40-64: CPT | Performed by: INTERNAL MEDICINE

## 2021-02-08 PROCEDURE — 73030 X-RAY EXAM OF SHOULDER: CPT

## 2021-02-08 RX ORDER — MELOXICAM 15 MG/1
15 TABLET ORAL DAILY
Qty: 30 TABLET | Refills: 3 | Status: SHIPPED | OUTPATIENT
Start: 2021-02-08 | End: 2022-02-04 | Stop reason: ALTCHOICE

## 2021-02-08 NOTE — PROGRESS NOTES
SUBJECTIVE:  2021   Bella Ley  1980    Past Medical History:   Diagnosis Date    Allergic rhinitis     Fibroids     Hypothyroidism      Past Surgical History:   Procedure Laterality Date     SECTION      CHOLECYSTECTOMY, LAPAROSCOPIC  1/3/15    with gram      Family History   Problem Relation Age of Onset    Diabetes Mother       Social History     Socioeconomic History    Marital status: Single     Spouse name: Not on file    Number of children: Not on file    Years of education: Not on file    Highest education level: Not on file   Occupational History    Not on file   Social Needs    Financial resource strain: Not on file    Food insecurity     Worry: Not on file     Inability: Not on file    Transportation needs     Medical: Not on file     Non-medical: Not on file   Tobacco Use    Smoking status: Never Smoker    Smokeless tobacco: Never Used   Substance and Sexual Activity    Alcohol use: No    Drug use: No    Sexual activity: Not on file   Lifestyle    Physical activity     Days per week: Not on file     Minutes per session: Not on file    Stress: Not on file   Relationships    Social connections     Talks on phone: Not on file     Gets together: Not on file     Attends [employer] service: Not on file     Active member of club or organization: Not on file     Attends meetings of clubs or organizations: Not on file     Relationship status: Not on file    Intimate partner violence     Fear of current or ex partner: Not on file     Emotionally abused: Not on file     Physically abused: Not on file     Forced sexual activity: Not on file   Other Topics Concern    Not on file   Social History Narrative    Not on file      Allergies   Allergen Reactions    Hydrocodone       Current Outpatient Medications   Medication Sig Dispense Refill    sulfacetamide (BLEPH-10) 10 % ophthalmic solution Place 2 drops into the left eye 4 times daily for 7 days 1 Bottle 0 Extraocular Movements: Extraocular movements intact. Conjunctiva/sclera: Conjunctivae normal.      Pupils: Pupils are equal, round, and reactive to light. Neck:      Musculoskeletal: Normal range of motion. No muscular tenderness. Thyroid: No thyromegaly. Vascular: No carotid bruit or JVD. Cardiovascular:      Rate and Rhythm: Normal rate and regular rhythm. Pulses: Normal pulses. Heart sounds: Normal heart sounds. No murmur. Pulmonary:      Effort: Pulmonary effort is normal. No respiratory distress. Breath sounds: Normal breath sounds. No wheezing, rhonchi or rales. Abdominal:      General: Bowel sounds are normal. There is no distension. Palpations: Abdomen is soft. Tenderness: There is no abdominal tenderness. There is no rebound. Musculoskeletal:         General: No swelling. Right lower leg: No edema. Left lower leg: No edema. Comments: FROM x 4   Lymphadenopathy:      Cervical: No cervical adenopathy. Skin:     General: Skin is warm and dry. Capillary Refill: Capillary refill takes 2 to 3 seconds. Coloration: Skin is not pale. Findings: No erythema or rash. Neurological:      Mental Status: She is alert and oriented to person, place, and time. Cranial Nerves: No cranial nerve deficit. Sensory: No sensory deficit. Motor: No abnormal muscle tone. Deep Tendon Reflexes: Reflexes normal.   Psychiatric:         Mood and Affect: Mood normal.         Behavior: Behavior normal.         Thought Content:  Thought content normal.         Judgment: Judgment normal.         Data Review:  No results found for: CHOL  No results found for: TRIG  Lab Results   Component Value Date    HDL 56 01/17/2020     Lab Results   Component Value Date    LDLCALC 107 (H) 01/17/2020     Lab Results   Component Value Date    LABVLDL 35 01/17/2020     No results found for: [facility]   Lab Results   Component Value Date WBC 7.2 01/17/2020    HGB 14.0 01/17/2020    HCT 41.4 01/17/2020    MCV 90.8 01/17/2020     01/17/2020     Lab Results   Component Value Date     01/17/2020    K 4.0 01/17/2020     01/17/2020    CO2 22 01/17/2020    BUN 11 01/17/2020    CREATININE <0.5 (L) 01/17/2020    GLUCOSE 113 (H) 01/03/2015    CALCIUM 9.2 01/17/2020    PROT 6.9 01/17/2020    LABALBU 4.3 01/17/2020    BILITOT 0.4 01/17/2020    ALKPHOS 81 01/17/2020    AST 22 01/17/2020    ALT 32 01/17/2020    LABGLOM >60 01/17/2020    GFRAA >60 01/17/2020    AGRATIO 1.7 01/17/2020    GLOB 2.6 01/17/2020       ASSESSMENT/PLAN  1.) Annual adult well exam-labs ordered with A1C      2.) L shoulder pain- check x-ray and try empiric Mobic    3.) advised OTC cream for fever blisters     GYN f/u is advised for PAP smears    F/u as planned   no active care gaps    Tayler Kurtz          Electronically signed by Tayler Kurtz MD on 2/8/2021 at 8:31 AM

## 2021-02-09 LAB
ESTIMATED AVERAGE GLUCOSE: 111.2 MG/DL
HBA1C MFR BLD: 5.5 %
HIV AG/AB: NORMAL
HIV ANTIGEN: NORMAL
HIV-1 ANTIBODY: NORMAL
HIV-2 AB: NORMAL

## 2022-02-04 ENCOUNTER — OFFICE VISIT (OUTPATIENT)
Dept: PRIMARY CARE CLINIC | Age: 42
End: 2022-02-04
Payer: COMMERCIAL

## 2022-02-04 VITALS
HEIGHT: 57 IN | BODY MASS INDEX: 31.07 KG/M2 | SYSTOLIC BLOOD PRESSURE: 98 MMHG | WEIGHT: 144 LBS | DIASTOLIC BLOOD PRESSURE: 68 MMHG | HEART RATE: 78 BPM

## 2022-02-04 DIAGNOSIS — R53.1 WEAKNESS: Primary | ICD-10-CM

## 2022-02-04 DIAGNOSIS — R07.9 CHEST PAIN, UNSPECIFIED TYPE: ICD-10-CM

## 2022-02-04 PROCEDURE — 93000 ELECTROCARDIOGRAM COMPLETE: CPT | Performed by: INTERNAL MEDICINE

## 2022-02-04 PROCEDURE — 99214 OFFICE O/P EST MOD 30 MIN: CPT | Performed by: INTERNAL MEDICINE

## 2022-02-04 ASSESSMENT — PATIENT HEALTH QUESTIONNAIRE - PHQ9
6. FEELING BAD ABOUT YOURSELF - OR THAT YOU ARE A FAILURE OR HAVE LET YOURSELF OR YOUR FAMILY DOWN: 0
4. FEELING TIRED OR HAVING LITTLE ENERGY: 0
SUM OF ALL RESPONSES TO PHQ QUESTIONS 1-9: 0
1. LITTLE INTEREST OR PLEASURE IN DOING THINGS: 0
SUM OF ALL RESPONSES TO PHQ QUESTIONS 1-9: 0
SUM OF ALL RESPONSES TO PHQ9 QUESTIONS 1 & 2: 0
2. FEELING DOWN, DEPRESSED OR HOPELESS: 0
8. MOVING OR SPEAKING SO SLOWLY THAT OTHER PEOPLE COULD HAVE NOTICED. OR THE OPPOSITE, BEING SO FIGETY OR RESTLESS THAT YOU HAVE BEEN MOVING AROUND A LOT MORE THAN USUAL: 0
5. POOR APPETITE OR OVEREATING: 0
3. TROUBLE FALLING OR STAYING ASLEEP: 0
10. IF YOU CHECKED OFF ANY PROBLEMS, HOW DIFFICULT HAVE THESE PROBLEMS MADE IT FOR YOU TO DO YOUR WORK, TAKE CARE OF THINGS AT HOME, OR GET ALONG WITH OTHER PEOPLE: 0
7. TROUBLE CONCENTRATING ON THINGS, SUCH AS READING THE NEWSPAPER OR WATCHING TELEVISION: 0
9. THOUGHTS THAT YOU WOULD BE BETTER OFF DEAD, OR OF HURTING YOURSELF: 0
SUM OF ALL RESPONSES TO PHQ QUESTIONS 1-9: 0
SUM OF ALL RESPONSES TO PHQ QUESTIONS 1-9: 0

## 2022-02-04 NOTE — PROGRESS NOTES
2/4/2022   Shayna Salinas  1980    The patients PMH, surgical history, family history, medications, allergies were all reviewed and updated as appropriate today. Current Outpatient Medications on File Prior to Visit   Medication Sig Dispense Refill    meloxicam (MOBIC) 15 MG tablet Take 1 tablet by mouth daily 30 tablet 3    ibuprofen (ADVIL;MOTRIN) 200 MG tablet Take 200 mg by mouth every 6 hours as needed for Pain       No current facility-administered medications on file prior to visit. Patient requires     Urgent visit due to report of hypotension and malaise x 3 weeks, reports BP ~100/60 when taken at home recently, c/o \"feeling bad\"  Last visit 1 year ago, on no BP lowering meds but \"takes garlic and N13\" to help improve the BP  HPI: Patient presents today for same-day visit, she has concerns that her blood pressures are running low then reports CP complaints so wants EKG done today  BP was 110/62 1 year ago   was in ER 2014 with c/o CP, EKG then only showed minor ST-T changes lateral chest leads    Review of Systems-wants an EKG done since she's feeling \"weak\". .had chest pain, \"never had it before\"      OBJECTIVE:  Ht 4' 9\" (1.448 m)   Wt 144 lb (65.3 kg)   BMI 31.16 kg/m²    BP 98/68 (Site: Left Upper Arm, Position: Sitting, Cuff Size: Medium Adult)   Pulse 78   Ht 4' 9\" (1.448 m)   Wt 144 lb (65.3 kg)   BMI 31.16 kg/m²     Wt Readings from Last 3 Encounters:   02/04/22 144 lb (65.3 kg)   02/08/21 148 lb 9.6 oz (67.4 kg)   02/05/21 150 lb (68 kg)       Physical Exam  Vitals and nursing note reviewed. Constitutional:       General: She is not in acute distress. Appearance: Normal appearance. She is well-developed. HENT:      Head: Normocephalic and atraumatic. Right Ear: Tympanic membrane, ear canal and external ear normal.      Left Ear: Tympanic membrane, ear canal and external ear normal.      Nose: Nose normal. No rhinorrhea.       Mouth/Throat: Pharynx: No oropharyngeal exudate or posterior oropharyngeal erythema. Eyes:      General:         Right eye: No discharge. Left eye: No discharge. Extraocular Movements: Extraocular movements intact. Conjunctiva/sclera: Conjunctivae normal.      Pupils: Pupils are equal, round, and reactive to light. Neck:      Thyroid: No thyromegaly. Vascular: No carotid bruit or JVD. Cardiovascular:      Rate and Rhythm: Normal rate and regular rhythm. Pulses: Normal pulses. Heart sounds: Normal heart sounds. No murmur heard. Pulmonary:      Effort: Pulmonary effort is normal. No respiratory distress. Breath sounds: Normal breath sounds. No wheezing, rhonchi or rales. Abdominal:      General: Bowel sounds are normal. There is no distension. Palpations: Abdomen is soft. Tenderness: There is no abdominal tenderness. There is no rebound. Musculoskeletal:         General: No swelling. Cervical back: Normal range of motion. No muscular tenderness. Right lower leg: No edema. Left lower leg: No edema. Comments: FROM x 4   Lymphadenopathy:      Cervical: No cervical adenopathy. Skin:     General: Skin is warm and dry. Capillary Refill: Capillary refill takes 2 to 3 seconds. Coloration: Skin is not pale. Findings: No erythema or rash. Neurological:      Mental Status: She is alert and oriented to person, place, and time. Cranial Nerves: No cranial nerve deficit. Sensory: No sensory deficit. Motor: No abnormal muscle tone. Deep Tendon Reflexes: Reflexes normal.   Psychiatric:         Mood and Affect: Mood normal.         Behavior: Behavior normal.         Thought Content:  Thought content normal.         Judgment: Judgment normal.         Data Review:   CBC:   Lab Results   Component Value Date    WBC 4.8 02/08/2021    WBC 7.2 01/17/2020    WBC 6.0 01/03/2015    HGB 13.4 02/08/2021    HGB 14.0 01/17/2020    HGB 12.8 01/03/2015    HCT 39.7 02/08/2021    HCT 41.4 01/17/2020    HCT 37.3 01/03/2015    MCV 89.4 02/08/2021    MCV 90.8 01/17/2020    MCV 88.1 01/03/2015     02/08/2021     01/17/2020     01/03/2015     Chemistry:   Lab Results   Component Value Date     02/08/2021     01/17/2020     01/03/2015    K 4.1 02/08/2021    K 4.0 01/17/2020    K 3.7 01/03/2015     02/08/2021     01/17/2020     01/03/2015    CO2 22 02/08/2021    CO2 22 01/17/2020    CO2 26 01/03/2015    BUN 14 02/08/2021    BUN 11 01/17/2020    BUN 7 01/03/2015    CREATININE <0.5 02/08/2021    CREATININE <0.5 01/17/2020    CREATININE 0.6 01/03/2015     Hepatic Function:   Lab Results   Component Value Date    AST 27 02/08/2021    AST 22 01/17/2020    AST 18 01/03/2015    ALT 42 02/08/2021    ALT 32 01/17/2020    ALT 25 01/03/2015    BILIDIR <0.2 01/03/2015    BILIDIR <0.2 01/02/2015    BILITOT 0.3 02/08/2021    BILITOT 0.4 01/17/2020    BILITOT 0.5 01/03/2015    ALKPHOS 79 02/08/2021    ALKPHOS 81 01/17/2020    ALKPHOS 70 01/03/2015     Lab Results   Component Value Date    LIPASE 21.0 01/02/2015    AMYLASE 28 01/02/2015     Lipids:   Lab Results   Component Value Date    HDL 55 02/08/2021       ASSESSMENT/PLAN  1.  Weakness with reports of Low BP on garlic and J16  EKG today is unremarkable-copy to patient  Check fasting labs   Stool FIT sent with patient since she had taken NSAID's in the past but not recently  Check iron studies due to age 39 female, may be iron deficient anemia  Encouraged po fluids until next week but BP today is not much different than from last visit 1 year ago     f/u in 1 week    Dickson Victor MD        Electronically signed by Dickson Victor MD on 2/4/2022 at 1:55 PM

## 2022-02-04 NOTE — PROGRESS NOTES
Ricardo  [id number]    Pt here for 3 weeks, her BP has been going down low, 102/63  105/66  100/54 - last night    Said that she sometimes gets dizzy, complaining about chest pain and feels like it closes in when it happens. Been happening a few days sofar this month. Said she has not been feeling good at all since this all started. Pt is taking I96 and garlic to help with bp.

## 2022-02-07 DIAGNOSIS — R53.1 WEAKNESS: ICD-10-CM

## 2022-02-07 LAB
A/G RATIO: 1.6 (ref 1.1–2.2)
ALBUMIN SERPL-MCNC: 4.1 G/DL (ref 3.4–5)
ALP BLD-CCNC: 73 U/L (ref 40–129)
ALT SERPL-CCNC: 23 U/L (ref 10–40)
ANION GAP SERPL CALCULATED.3IONS-SCNC: 14 MMOL/L (ref 3–16)
AST SERPL-CCNC: 15 U/L (ref 15–37)
BASOPHILS ABSOLUTE: 0 K/UL (ref 0–0.2)
BASOPHILS RELATIVE PERCENT: 0.6 %
BILIRUB SERPL-MCNC: 0.3 MG/DL (ref 0–1)
BUN BLDV-MCNC: 12 MG/DL (ref 7–20)
CALCIUM SERPL-MCNC: 9.1 MG/DL (ref 8.3–10.6)
CHLORIDE BLD-SCNC: 103 MMOL/L (ref 99–110)
CHOLESTEROL, FASTING: 179 MG/DL (ref 0–199)
CO2: 23 MMOL/L (ref 21–32)
CREAT SERPL-MCNC: <0.5 MG/DL (ref 0.6–1.1)
EOSINOPHILS ABSOLUTE: 0.1 K/UL (ref 0–0.6)
EOSINOPHILS RELATIVE PERCENT: 1.6 %
FOLATE: 18.01 NG/ML (ref 4.78–24.2)
GFR AFRICAN AMERICAN: >60
GFR NON-AFRICAN AMERICAN: >60
GLUCOSE FASTING: 91 MG/DL (ref 70–99)
HCT VFR BLD CALC: 39.1 % (ref 36–48)
HDLC SERPL-MCNC: 52 MG/DL (ref 40–60)
HEMOGLOBIN: 13.2 G/DL (ref 12–16)
IRON % SATURATION: 28 % (ref 15–50)
IRON: 86 UG/DL (ref 37–145)
LDL CHOLESTEROL CALCULATED: 80 MG/DL
LYMPHOCYTES ABSOLUTE: 1.8 K/UL (ref 1–5.1)
LYMPHOCYTES RELATIVE PERCENT: 31.8 %
MCH RBC QN AUTO: 30 PG (ref 26–34)
MCHC RBC AUTO-ENTMCNC: 33.7 G/DL (ref 31–36)
MCV RBC AUTO: 89.2 FL (ref 80–100)
MONOCYTES ABSOLUTE: 0.4 K/UL (ref 0–1.3)
MONOCYTES RELATIVE PERCENT: 6.7 %
NEUTROPHILS ABSOLUTE: 3.3 K/UL (ref 1.7–7.7)
NEUTROPHILS RELATIVE PERCENT: 59.3 %
PDW BLD-RTO: 13.1 % (ref 12.4–15.4)
PLATELET # BLD: 189 K/UL (ref 135–450)
PMV BLD AUTO: 10.1 FL (ref 5–10.5)
POTASSIUM SERPL-SCNC: 4 MMOL/L (ref 3.5–5.1)
RBC # BLD: 4.39 M/UL (ref 4–5.2)
SODIUM BLD-SCNC: 140 MMOL/L (ref 136–145)
TOTAL IRON BINDING CAPACITY: 307 UG/DL (ref 260–445)
TOTAL PROTEIN: 6.6 G/DL (ref 6.4–8.2)
TRIGLYCERIDE, FASTING: 237 MG/DL (ref 0–150)
TSH SERPL DL<=0.05 MIU/L-ACNC: 2.85 UIU/ML (ref 0.27–4.2)
VITAMIN B-12: 1208 PG/ML (ref 211–911)
VLDLC SERPL CALC-MCNC: 47 MG/DL
WBC # BLD: 5.5 K/UL (ref 4–11)

## 2022-02-08 LAB
ESTIMATED AVERAGE GLUCOSE: 108.3 MG/DL
HBA1C MFR BLD: 5.4 %

## 2022-02-14 ENCOUNTER — OFFICE VISIT (OUTPATIENT)
Dept: PRIMARY CARE CLINIC | Age: 42
End: 2022-02-14
Payer: COMMERCIAL

## 2022-02-14 VITALS
WEIGHT: 144 LBS | SYSTOLIC BLOOD PRESSURE: 100 MMHG | HEART RATE: 70 BPM | DIASTOLIC BLOOD PRESSURE: 70 MMHG | HEIGHT: 57 IN | BODY MASS INDEX: 31.07 KG/M2

## 2022-02-14 DIAGNOSIS — I95.0 IDIOPATHIC HYPOTENSION: Primary | ICD-10-CM

## 2022-02-14 DIAGNOSIS — R53.1 WEAKNESS: ICD-10-CM

## 2022-02-14 LAB
CONTROL: POSITIVE
HEMOCCULT STL QL: NEGATIVE

## 2022-02-14 PROCEDURE — 82274 ASSAY TEST FOR BLOOD FECAL: CPT | Performed by: INTERNAL MEDICINE

## 2022-02-14 PROCEDURE — 99213 OFFICE O/P EST LOW 20 MIN: CPT | Performed by: INTERNAL MEDICINE

## 2022-02-14 NOTE — PROGRESS NOTES
Jennifer Steel [id number]    Here for follow up 1 week for hypotension    Pressure still running low. Says that she is still tired but not as much as 1 week ago.      Pt also dropped off sample for FIT

## 2022-02-14 NOTE — PROGRESS NOTES
2/14/2022   Ulises Patient  1980    The patients PMH, surgical history, family history, medications, allergies were all reviewed and updated as appropriate today. No current outpatient medications on file prior to visit. No current facility-administered medications on file prior to visit. REQUIRES     Chief Complaint   Patient presents with    Fatigue    Hypotension       HPI: Patient continues to return for complaints of generalized weakness and malaise. Her labs were unremarkable, EKG done last visit looked completely normal.    Review of Systems-feeling better since last visit 1 week ago    OBJECTIVE:  BP 92/64 (Site: Left Upper Arm, Position: Sitting, Cuff Size: Medium Adult)   Pulse 70   Ht 4' 9\" (1.448 m)   Wt 144 lb (65.3 kg)   BMI 31.16 kg/m²    Physical Exam  Vitals and nursing note reviewed. Constitutional:       General: She is not in acute distress. Appearance: Normal appearance. She is well-developed. HENT:      Head: Normocephalic and atraumatic. Right Ear: Tympanic membrane, ear canal and external ear normal.      Left Ear: Tympanic membrane, ear canal and external ear normal.      Nose: Nose normal. No rhinorrhea. Mouth/Throat:      Pharynx: No oropharyngeal exudate or posterior oropharyngeal erythema. Eyes:      General:         Right eye: No discharge. Left eye: No discharge. Extraocular Movements: Extraocular movements intact. Conjunctiva/sclera: Conjunctivae normal.      Pupils: Pupils are equal, round, and reactive to light. Neck:      Thyroid: No thyromegaly. Vascular: No carotid bruit or JVD. Cardiovascular:      Rate and Rhythm: Normal rate and regular rhythm. Pulses: Normal pulses. Heart sounds: Normal heart sounds. No murmur heard. Pulmonary:      Effort: Pulmonary effort is normal. No respiratory distress. Breath sounds: Normal breath sounds. No wheezing, rhonchi or rales. Abdominal:      General: Bowel sounds are normal. There is no distension. Palpations: Abdomen is soft. Tenderness: There is no abdominal tenderness. There is no rebound. Musculoskeletal:         General: No swelling. Cervical back: Normal range of motion. No muscular tenderness. Right lower leg: No edema. Left lower leg: No edema. Comments: FROM x 4   Lymphadenopathy:      Cervical: No cervical adenopathy. Skin:     General: Skin is warm and dry. Capillary Refill: Capillary refill takes 2 to 3 seconds. Coloration: Skin is not pale. Findings: No erythema or rash. Neurological:      Mental Status: She is alert and oriented to person, place, and time. Cranial Nerves: No cranial nerve deficit. Sensory: No sensory deficit. Motor: No abnormal muscle tone. Deep Tendon Reflexes: Reflexes normal.   Psychiatric:         Mood and Affect: Mood normal.         Behavior: Behavior normal.         Thought Content:  Thought content normal.         Judgment: Judgment normal.         Data Review:   CBC:   Lab Results   Component Value Date    WBC 5.5 02/07/2022    WBC 4.8 02/08/2021    WBC 7.2 01/17/2020    HGB 13.2 02/07/2022    HGB 13.4 02/08/2021    HGB 14.0 01/17/2020    HCT 39.1 02/07/2022    HCT 39.7 02/08/2021    HCT 41.4 01/17/2020    MCV 89.2 02/07/2022    MCV 89.4 02/08/2021    MCV 90.8 01/17/2020     02/07/2022     02/08/2021     01/17/2020     Chemistry:   Lab Results   Component Value Date     02/07/2022     02/08/2021     01/17/2020    K 4.0 02/07/2022    K 4.1 02/08/2021    K 4.0 01/17/2020     02/07/2022     02/08/2021     01/17/2020    CO2 23 02/07/2022    CO2 22 02/08/2021    CO2 22 01/17/2020    BUN 12 02/07/2022    BUN 14 02/08/2021    BUN 11 01/17/2020    CREATININE <0.5 02/07/2022    CREATININE <0.5 02/08/2021    CREATININE <0.5 01/17/2020     Hepatic Function:   Lab Results   Component Value Date    AST 15 02/07/2022    AST 27 02/08/2021    AST 22 01/17/2020    ALT 23 02/07/2022    ALT 42 02/08/2021    ALT 32 01/17/2020    BILIDIR <0.2 01/03/2015    BILIDIR <0.2 01/02/2015    BILITOT 0.3 02/07/2022    BILITOT 0.3 02/08/2021    BILITOT 0.4 01/17/2020    ALKPHOS 73 02/07/2022    ALKPHOS 79 02/08/2021    ALKPHOS 81 01/17/2020     Lab Results   Component Value Date    LIPASE 21.0 01/02/2015    AMYLASE 28 01/02/2015     Lipids:   Lab Results   Component Value Date    HDL 52 02/07/2022       ASSESSMENT/PLAN  1.)  Hypotensionsince patient is feeling better, she agrees that no additional testing will be necessary right now   If her symptoms recur, she will call back and  we will check echocardiogram although clinical suspicion for aortic stenosis is actually very low.     F/u prn    Hanh Ugalde MD    Electronically signed by Hanh Ugalde MD on 2/14/2022 at 12:41 PM

## 2022-04-26 ENCOUNTER — OFFICE VISIT (OUTPATIENT)
Dept: PRIMARY CARE CLINIC | Age: 42
End: 2022-04-26
Payer: COMMERCIAL

## 2022-04-26 VITALS
TEMPERATURE: 97.8 F | BODY MASS INDEX: 31.16 KG/M2 | WEIGHT: 144 LBS | OXYGEN SATURATION: 98 % | DIASTOLIC BLOOD PRESSURE: 68 MMHG | SYSTOLIC BLOOD PRESSURE: 102 MMHG | HEART RATE: 51 BPM

## 2022-04-26 DIAGNOSIS — M25.512 PAIN AND SWELLING OF LEFT SHOULDER: ICD-10-CM

## 2022-04-26 DIAGNOSIS — R52 PAIN: Primary | ICD-10-CM

## 2022-04-26 DIAGNOSIS — M25.511 PAIN IN JOINT OF RIGHT SHOULDER: ICD-10-CM

## 2022-04-26 DIAGNOSIS — M25.412 PAIN AND SWELLING OF LEFT SHOULDER: ICD-10-CM

## 2022-04-26 LAB
A/G RATIO: 1.6 (ref 1.1–2.2)
ALBUMIN SERPL-MCNC: 4.3 G/DL (ref 3.4–5)
ALP BLD-CCNC: 103 U/L (ref 40–129)
ALT SERPL-CCNC: 19 U/L (ref 10–40)
ANION GAP SERPL CALCULATED.3IONS-SCNC: 13 MMOL/L (ref 3–16)
AST SERPL-CCNC: 16 U/L (ref 15–37)
BASOPHILS ABSOLUTE: 0 K/UL (ref 0–0.2)
BASOPHILS RELATIVE PERCENT: 0.6 %
BILIRUB SERPL-MCNC: <0.2 MG/DL (ref 0–1)
BUN BLDV-MCNC: 18 MG/DL (ref 7–20)
CALCIUM SERPL-MCNC: 9.7 MG/DL (ref 8.3–10.6)
CHLORIDE BLD-SCNC: 103 MMOL/L (ref 99–110)
CO2: 22 MMOL/L (ref 21–32)
CREAT SERPL-MCNC: 0.7 MG/DL (ref 0.6–1.1)
EOSINOPHILS ABSOLUTE: 0.1 K/UL (ref 0–0.6)
EOSINOPHILS RELATIVE PERCENT: 1.1 %
GFR AFRICAN AMERICAN: >60
GFR NON-AFRICAN AMERICAN: >60
GLUCOSE BLD-MCNC: 100 MG/DL (ref 70–99)
HCT VFR BLD CALC: 38.2 % (ref 36–48)
HEMOGLOBIN: 13.2 G/DL (ref 12–16)
LYMPHOCYTES ABSOLUTE: 1.9 K/UL (ref 1–5.1)
LYMPHOCYTES RELATIVE PERCENT: 30.6 %
MCH RBC QN AUTO: 30.9 PG (ref 26–34)
MCHC RBC AUTO-ENTMCNC: 34.6 G/DL (ref 31–36)
MCV RBC AUTO: 89.3 FL (ref 80–100)
MONOCYTES ABSOLUTE: 0.4 K/UL (ref 0–1.3)
MONOCYTES RELATIVE PERCENT: 6.9 %
NEUTROPHILS ABSOLUTE: 3.7 K/UL (ref 1.7–7.7)
NEUTROPHILS RELATIVE PERCENT: 60.8 %
PDW BLD-RTO: 13.4 % (ref 12.4–15.4)
PLATELET # BLD: 173 K/UL (ref 135–450)
PMV BLD AUTO: 9.6 FL (ref 5–10.5)
POTASSIUM SERPL-SCNC: 4.3 MMOL/L (ref 3.5–5.1)
RBC # BLD: 4.27 M/UL (ref 4–5.2)
RHEUMATOID FACTOR: 11 IU/ML
SEDIMENTATION RATE, ERYTHROCYTE: 25 MM/HR (ref 0–20)
SODIUM BLD-SCNC: 138 MMOL/L (ref 136–145)
TOTAL PROTEIN: 7 G/DL (ref 6.4–8.2)
URIC ACID, SERUM: 4 MG/DL (ref 2.6–6)
WBC # BLD: 6 K/UL (ref 4–11)

## 2022-04-26 PROCEDURE — 99213 OFFICE O/P EST LOW 20 MIN: CPT | Performed by: INTERNAL MEDICINE

## 2022-04-26 RX ORDER — DICLOFENAC SODIUM 75 MG/1
75 TABLET, DELAYED RELEASE ORAL 2 TIMES DAILY
Qty: 30 TABLET | Refills: 1 | Status: SHIPPED | OUTPATIENT
Start: 2022-04-26 | End: 2022-05-20

## 2022-04-26 RX ORDER — SODIUM FLUORIDE 1.1 G/100G
GEL, DENTIFRICE ORAL
COMMUNITY
Start: 2022-01-29

## 2022-04-26 NOTE — PROGRESS NOTES
4/26/2022   Zane Better  1980    The patients PMH, surgical history, family history, medications, allergies were all reviewed and updated as appropriate today. No current outpatient medications on file prior to visit. No current facility-administered medications on file prior to visit. Chief Complaint   Patient presents with    Arm Pain     c/o left shoulder pain, arm pain    Finger Pain     c/o pain and swelling left ring finger. Sx 2 weeks. No known injury       REQUIRES     HPI:  C/o generalized achiness without injury arnaldo L shoulder and arm x 2 weeks  Then thinks that her L 4th finger might be swollen too  Wants some arthritis tests done today    Review of Systems    OBJECTIVE:  /68   Pulse 51   Temp 97.8 °F (36.6 °C) (Infrared)   Wt 144 lb (65.3 kg)   SpO2 98%   BMI 31.16 kg/m²   Wt Readings from Last 3 Encounters:   04/26/22 144 lb (65.3 kg)   02/14/22 144 lb (65.3 kg)   02/04/22 144 lb (65.3 kg)       Physical Exam  Vitals and nursing note reviewed. Constitutional:       Appearance: She is well-developed. Neck:      Thyroid: No thyromegaly. Vascular: No JVD. Cardiovascular:      Rate and Rhythm: Normal rate and regular rhythm. Heart sounds: Normal heart sounds. Pulmonary:      Effort: Pulmonary effort is normal.      Breath sounds: Normal breath sounds. Musculoskeletal:         General: Tenderness present. No swelling, deformity or signs of injury. Normal range of motion. Comments: L hand/4th ring finger  No ecchymosis or edema appreciated on exam  Full ROM L shoulder  Heberdon's nodes L 4th finger DIP joint   Neurological:      Mental Status: She is alert and oriented to person, place, and time. Deep Tendon Reflexes: Reflexes are normal and symmetric.    Psychiatric:         Behavior: Behavior normal.         Data Review:   CBC:   Lab Results   Component Value Date    WBC 5.5 02/07/2022    WBC 4.8 02/08/2021    WBC 7.2 01/17/2020    HGB 13.2 02/07/2022    HGB 13.4 02/08/2021    HGB 14.0 01/17/2020    HCT 39.1 02/07/2022    HCT 39.7 02/08/2021    HCT 41.4 01/17/2020    MCV 89.2 02/07/2022    MCV 89.4 02/08/2021    MCV 90.8 01/17/2020     02/07/2022     02/08/2021     01/17/2020     Chemistry:   Lab Results   Component Value Date     02/07/2022     02/08/2021     01/17/2020    K 4.0 02/07/2022    K 4.1 02/08/2021    K 4.0 01/17/2020     02/07/2022     02/08/2021     01/17/2020    CO2 23 02/07/2022    CO2 22 02/08/2021    CO2 22 01/17/2020    BUN 12 02/07/2022    BUN 14 02/08/2021    BUN 11 01/17/2020    CREATININE <0.5 02/07/2022    CREATININE <0.5 02/08/2021    CREATININE <0.5 01/17/2020     Hepatic Function:   Lab Results   Component Value Date    AST 15 02/07/2022    AST 27 02/08/2021    AST 22 01/17/2020    ALT 23 02/07/2022    ALT 42 02/08/2021    ALT 32 01/17/2020    BILIDIR <0.2 01/03/2015    BILIDIR <0.2 01/02/2015    BILITOT 0.3 02/07/2022    BILITOT 0.3 02/08/2021    BILITOT 0.4 01/17/2020    ALKPHOS 73 02/07/2022    ALKPHOS 79 02/08/2021    ALKPHOS 81 01/17/2020     Lab Results   Component Value Date    LIPASE 21.0 01/02/2015    AMYLASE 28 01/02/2015     Lipids:   Lab Results   Component Value Date    HDL 52 02/07/2022       ASSESSMENT/PLAN  1.  Pain L hand-c/w osteoarthritis with presence of Heberdon's nodes  Check labs to r/o other types of arthritis  Diclofenac Na 75 BIDpc x 2 weeks, then may transition to Advil/Aleve nataliia Putnam MD             Electronically signed by Waylon Putnam MD on 4/26/2022 at 2:17 PM

## 2022-05-20 DIAGNOSIS — M25.512 PAIN AND SWELLING OF LEFT SHOULDER: ICD-10-CM

## 2022-05-20 DIAGNOSIS — M25.412 PAIN AND SWELLING OF LEFT SHOULDER: ICD-10-CM

## 2022-05-20 RX ORDER — DICLOFENAC SODIUM 75 MG/1
TABLET, DELAYED RELEASE ORAL
Qty: 60 TABLET | Refills: 1 | Status: SHIPPED | OUTPATIENT
Start: 2022-05-20

## 2023-01-27 ENCOUNTER — OFFICE VISIT (OUTPATIENT)
Dept: PRIMARY CARE CLINIC | Age: 43
End: 2023-01-27
Payer: COMMERCIAL

## 2023-01-27 VITALS
DIASTOLIC BLOOD PRESSURE: 60 MMHG | HEART RATE: 60 BPM | SYSTOLIC BLOOD PRESSURE: 116 MMHG | TEMPERATURE: 98 F | OXYGEN SATURATION: 100 %

## 2023-01-27 DIAGNOSIS — Z00.00 WELL ADULT EXAM: Primary | ICD-10-CM

## 2023-01-27 DIAGNOSIS — R53.1 WEAKNESS: ICD-10-CM

## 2023-01-27 LAB
A/G RATIO: 1.8 (ref 1.1–2.2)
ALBUMIN SERPL-MCNC: 4.4 G/DL (ref 3.4–5)
ALP BLD-CCNC: 75 U/L (ref 40–129)
ALT SERPL-CCNC: 20 U/L (ref 10–40)
ANION GAP SERPL CALCULATED.3IONS-SCNC: 15 MMOL/L (ref 3–16)
AST SERPL-CCNC: 20 U/L (ref 15–37)
BASOPHILS ABSOLUTE: 0 K/UL (ref 0–0.2)
BASOPHILS RELATIVE PERCENT: 0.6 %
BILIRUB SERPL-MCNC: 0.4 MG/DL (ref 0–1)
BUN BLDV-MCNC: 11 MG/DL (ref 7–20)
CALCIUM SERPL-MCNC: 9.2 MG/DL (ref 8.3–10.6)
CHLORIDE BLD-SCNC: 105 MMOL/L (ref 99–110)
CHOLESTEROL, FASTING: 201 MG/DL (ref 0–199)
CO2: 21 MMOL/L (ref 21–32)
CREAT SERPL-MCNC: <0.5 MG/DL (ref 0.6–1.1)
EOSINOPHILS ABSOLUTE: 0.1 K/UL (ref 0–0.6)
EOSINOPHILS RELATIVE PERCENT: 2 %
GFR SERPL CREATININE-BSD FRML MDRD: >60 ML/MIN/{1.73_M2}
GLUCOSE FASTING: 82 MG/DL (ref 70–99)
HCT VFR BLD CALC: 39.3 % (ref 36–48)
HDLC SERPL-MCNC: 60 MG/DL (ref 40–60)
HEMOGLOBIN: 13.2 G/DL (ref 12–16)
LDL CHOLESTEROL CALCULATED: 109 MG/DL
LYMPHOCYTES ABSOLUTE: 1.6 K/UL (ref 1–5.1)
LYMPHOCYTES RELATIVE PERCENT: 33.5 %
MCH RBC QN AUTO: 29.8 PG (ref 26–34)
MCHC RBC AUTO-ENTMCNC: 33.5 G/DL (ref 31–36)
MCV RBC AUTO: 89.1 FL (ref 80–100)
MONOCYTES ABSOLUTE: 0.4 K/UL (ref 0–1.3)
MONOCYTES RELATIVE PERCENT: 9.1 %
NEUTROPHILS ABSOLUTE: 2.6 K/UL (ref 1.7–7.7)
NEUTROPHILS RELATIVE PERCENT: 54.8 %
PDW BLD-RTO: 13.1 % (ref 12.4–15.4)
PLATELET # BLD: 161 K/UL (ref 135–450)
PMV BLD AUTO: 10.2 FL (ref 5–10.5)
POTASSIUM SERPL-SCNC: 4 MMOL/L (ref 3.5–5.1)
RBC # BLD: 4.42 M/UL (ref 4–5.2)
SODIUM BLD-SCNC: 141 MMOL/L (ref 136–145)
TOTAL PROTEIN: 6.8 G/DL (ref 6.4–8.2)
TRIGLYCERIDE, FASTING: 162 MG/DL (ref 0–150)
VLDLC SERPL CALC-MCNC: 32 MG/DL
WBC # BLD: 4.8 K/UL (ref 4–11)

## 2023-01-27 PROCEDURE — 99386 PREV VISIT NEW AGE 40-64: CPT | Performed by: INTERNAL MEDICINE

## 2023-01-27 ASSESSMENT — PATIENT HEALTH QUESTIONNAIRE - PHQ9
2. FEELING DOWN, DEPRESSED OR HOPELESS: 0
SUM OF ALL RESPONSES TO PHQ QUESTIONS 1-9: 0
1. LITTLE INTEREST OR PLEASURE IN DOING THINGS: 0
SUM OF ALL RESPONSES TO PHQ QUESTIONS 1-9: 0
SUM OF ALL RESPONSES TO PHQ9 QUESTIONS 1 & 2: 0

## 2023-01-27 NOTE — PROGRESS NOTES
2023   Katty Reid  1980    The patients PMH, surgical history, family history, medications, allergies were all reviewed and updated as appropriate today. Current Outpatient Medications on File Prior to Visit   Medication Sig Dispense Refill    diclofenac (VOLTAREN) 75 MG EC tablet TOME JAD TABLETA DOS VECES AL LETICIA 60 tablet 1    DENTA 5000 PLUS 1.1 % CREA BRUSH DOS VECES AL D A FOR 2 MINUTES, THEN SPIT AND DO NOT RINSE       No current facility-administered medications on file prior to visit. REQUIRES     Chief Complaint   Patient presents with    Annual Exam     InterpLiddie Binder #[id number]  Pt here for physical   No complaints            HPI:  here for a Physical today  On no medications  Needs labs ordered, last done 2022  C/o some pain L leg/foot after pushing daughter 2 years ago    Past Medical History:   Diagnosis Date    Allergic rhinitis     Fibroids     Hypothyroidism      Past Surgical History:   Procedure Laterality Date     SECTION      CHOLECYSTECTOMY, LAPAROSCOPIC  1/3/15    with gram     Social History     Socioeconomic History    Marital status: Single   Tobacco Use    Smoking status: Never    Smokeless tobacco: Never   Vaping Use    Vaping Use: Never used   Substance and Sexual Activity    Alcohol use: No    Drug use: No     Family History   Problem Relation Age of Onset    Diabetes Mother        Review of Systems    OBJECTIVE:  /60   Pulse 60   Temp 98 °F (36.7 °C) (Infrared)   LMP 2022   SpO2 100%   Wt Readings from Last 3 Encounters:   22 144 lb (65.3 kg)   22 144 lb (65.3 kg)   22 144 lb (65.3 kg)       Physical Exam  Vitals and nursing note reviewed. Constitutional:       General: She is not in acute distress. Appearance: Normal appearance. She is well-developed. HENT:      Head: Normocephalic and atraumatic.       Right Ear: Tympanic membrane, ear canal and external ear normal.      Left Ear: Tympanic membrane, ear canal and external ear normal.      Nose: Nose normal. No rhinorrhea. Mouth/Throat:      Pharynx: No oropharyngeal exudate or posterior oropharyngeal erythema. Eyes:      General:         Right eye: No discharge. Left eye: No discharge. Extraocular Movements: Extraocular movements intact. Conjunctiva/sclera: Conjunctivae normal.      Pupils: Pupils are equal, round, and reactive to light. Neck:      Thyroid: No thyromegaly. Vascular: No carotid bruit or JVD. Cardiovascular:      Rate and Rhythm: Normal rate and regular rhythm. Pulses: Normal pulses. Heart sounds: Normal heart sounds. No murmur heard. Pulmonary:      Effort: Pulmonary effort is normal. No respiratory distress. Breath sounds: Normal breath sounds. No wheezing, rhonchi or rales. Abdominal:      General: Bowel sounds are normal. There is no distension. Palpations: Abdomen is soft. Tenderness: There is no abdominal tenderness. There is no rebound. Musculoskeletal:         General: No swelling. Cervical back: Normal range of motion. No muscular tenderness. Right lower leg: No edema. Left lower leg: No edema. Comments: FROM x 4   Lymphadenopathy:      Cervical: No cervical adenopathy. Skin:     General: Skin is warm and dry. Capillary Refill: Capillary refill takes 2 to 3 seconds. Coloration: Skin is not pale. Findings: No erythema or rash. Neurological:      Mental Status: She is alert and oriented to person, place, and time. Cranial Nerves: No cranial nerve deficit. Sensory: No sensory deficit. Motor: No abnormal muscle tone. Deep Tendon Reflexes: Reflexes normal.   Psychiatric:         Mood and Affect: Mood normal.         Behavior: Behavior normal.         Thought Content:  Thought content normal.         Judgment: Judgment normal.       Data Review:   CBC:   Lab Results   Component Value Date/Time    WBC 6.0 04/26/2022 03:54 PM    WBC 5.5 02/07/2022 08:25 AM    WBC 4.8 02/08/2021 09:32 AM    HGB 13.2 04/26/2022 03:54 PM    HGB 13.2 02/07/2022 08:25 AM    HGB 13.4 02/08/2021 09:32 AM    HCT 38.2 04/26/2022 03:54 PM    HCT 39.1 02/07/2022 08:25 AM    HCT 39.7 02/08/2021 09:32 AM    MCV 89.3 04/26/2022 03:54 PM    MCV 89.2 02/07/2022 08:25 AM    MCV 89.4 02/08/2021 09:32 AM     04/26/2022 03:54 PM     02/07/2022 08:25 AM     02/08/2021 09:32 AM     Chemistry:   Lab Results   Component Value Date/Time     04/26/2022 03:54 PM     02/07/2022 08:25 AM     02/08/2021 09:32 AM    K 4.3 04/26/2022 03:54 PM    K 4.0 02/07/2022 08:25 AM    K 4.1 02/08/2021 09:32 AM     04/26/2022 03:54 PM     02/07/2022 08:25 AM     02/08/2021 09:32 AM    CO2 22 04/26/2022 03:54 PM    CO2 23 02/07/2022 08:25 AM    CO2 22 02/08/2021 09:32 AM    BUN 18 04/26/2022 03:54 PM    BUN 12 02/07/2022 08:25 AM    BUN 14 02/08/2021 09:32 AM    CREATININE 0.7 04/26/2022 03:54 PM    CREATININE <0.5 02/07/2022 08:25 AM    CREATININE <0.5 02/08/2021 09:32 AM     Hepatic Function:   Lab Results   Component Value Date/Time    AST 16 04/26/2022 03:54 PM    AST 15 02/07/2022 08:25 AM    AST 27 02/08/2021 09:32 AM    ALT 19 04/26/2022 03:54 PM    ALT 23 02/07/2022 08:25 AM    ALT 42 02/08/2021 09:32 AM    BILIDIR <0.2 01/03/2015 06:15 AM    BILIDIR <0.2 01/02/2015 12:55 PM    BILITOT <0.2 04/26/2022 03:54 PM    BILITOT 0.3 02/07/2022 08:25 AM    BILITOT 0.3 02/08/2021 09:32 AM    ALKPHOS 103 04/26/2022 03:54 PM    ALKPHOS 73 02/07/2022 08:25 AM    ALKPHOS 79 02/08/2021 09:32 AM     Lab Results   Component Value Date/Time    LIPASE 21.0 01/02/2015 12:55 PM    AMYLASE 28 01/02/2015 12:55 PM     Lipids:   Lab Results   Component Value Date/Time    HDL 52 02/07/2022 08:25 AM       ASSESSMENT/PLAN  1.) Well adult exam- fasting labs ordered    COVID booster advised at pharmacy    Cuca Ernst MD        Electronically signed by Viridiana Espinosa MD on 1/27/2023 at 8:24 AM

## 2023-01-27 NOTE — PATIENT INSTRUCTIONS
Bivalent COVID booster is advised at pharmacy    Continue PAP smears with GYN as indicated    Check fasting labs

## 2024-10-28 ENCOUNTER — OFFICE VISIT (OUTPATIENT)
Dept: PRIMARY CARE CLINIC | Age: 44
End: 2024-10-28
Payer: COMMERCIAL

## 2024-10-28 VITALS
DIASTOLIC BLOOD PRESSURE: 70 MMHG | OXYGEN SATURATION: 97 % | WEIGHT: 144.4 LBS | SYSTOLIC BLOOD PRESSURE: 112 MMHG | HEIGHT: 57 IN | HEART RATE: 74 BPM | BODY MASS INDEX: 31.15 KG/M2

## 2024-10-28 DIAGNOSIS — R10.2 VAGINAL PAIN: ICD-10-CM

## 2024-10-28 DIAGNOSIS — Z01.419 ENCOUNTER FOR WELL WOMAN EXAM: Primary | ICD-10-CM

## 2024-10-28 PROCEDURE — 99396 PREV VISIT EST AGE 40-64: CPT | Performed by: FAMILY MEDICINE

## 2024-10-28 PROCEDURE — 36415 COLL VENOUS BLD VENIPUNCTURE: CPT | Performed by: FAMILY MEDICINE

## 2024-10-28 RX ORDER — ESTRADIOL 0.1 MG/G
CREAM VAGINAL
Qty: 42.5 G | Refills: 0 | Status: SHIPPED | OUTPATIENT
Start: 2024-10-28

## 2024-10-28 SDOH — ECONOMIC STABILITY: FOOD INSECURITY: WITHIN THE PAST 12 MONTHS, YOU WORRIED THAT YOUR FOOD WOULD RUN OUT BEFORE YOU GOT MONEY TO BUY MORE.: NEVER TRUE

## 2024-10-28 SDOH — ECONOMIC STABILITY: INCOME INSECURITY: HOW HARD IS IT FOR YOU TO PAY FOR THE VERY BASICS LIKE FOOD, HOUSING, MEDICAL CARE, AND HEATING?: NOT HARD AT ALL

## 2024-10-28 SDOH — ECONOMIC STABILITY: FOOD INSECURITY: WITHIN THE PAST 12 MONTHS, THE FOOD YOU BOUGHT JUST DIDN'T LAST AND YOU DIDN'T HAVE MONEY TO GET MORE.: NEVER TRUE

## 2024-10-28 ASSESSMENT — ANXIETY QUESTIONNAIRES
2. NOT BEING ABLE TO STOP OR CONTROL WORRYING: NOT AT ALL
7. FEELING AFRAID AS IF SOMETHING AWFUL MIGHT HAPPEN: NOT AT ALL
4. TROUBLE RELAXING: NOT AT ALL
3. WORRYING TOO MUCH ABOUT DIFFERENT THINGS: NOT AT ALL
5. BEING SO RESTLESS THAT IT IS HARD TO SIT STILL: NOT AT ALL
IF YOU CHECKED OFF ANY PROBLEMS ON THIS QUESTIONNAIRE, HOW DIFFICULT HAVE THESE PROBLEMS MADE IT FOR YOU TO DO YOUR WORK, TAKE CARE OF THINGS AT HOME, OR GET ALONG WITH OTHER PEOPLE: NOT DIFFICULT AT ALL
1. FEELING NERVOUS, ANXIOUS, OR ON EDGE: NOT AT ALL

## 2024-10-28 ASSESSMENT — PATIENT HEALTH QUESTIONNAIRE - PHQ9
2. FEELING DOWN, DEPRESSED OR HOPELESS: NOT AT ALL
SUM OF ALL RESPONSES TO PHQ QUESTIONS 1-9: 0

## 2024-10-28 ASSESSMENT — ENCOUNTER SYMPTOMS
EYE PAIN: 0
COUGH: 0
SHORTNESS OF BREATH: 0
ABDOMINAL PAIN: 0
SORE THROAT: 0

## 2024-10-28 NOTE — PROGRESS NOTES
1/3/15    with gram        ASSESSMENT/PLAN:  1. Encounter for well woman exam  - PAP sent  - patient to return for labs  - up to date on mammogram  - flu vaccine declined  - Hemoglobin A1C; Future  - Lipid, Fasting; Future  - Comprehensive Metabolic Panel; Future  - CBC with Auto Differential; Future  - PAP SMEAR    2. Vaginal pain  - Estrace cream sent to pharmacy to use every other day for 1 month  - if symptoms fail to improve, will refer to gyn for further evaluation and management       No follow-ups on file.    Electronically signed by Reyna Haley MD on 10/28/2024 at 3:58 PM

## 2024-10-29 ENCOUNTER — LAB (OUTPATIENT)
Dept: PRIMARY CARE CLINIC | Age: 44
End: 2024-10-29

## 2024-10-30 LAB
ALBUMIN SERPL-MCNC: 4.4 G/DL (ref 3.4–5)
ALBUMIN/GLOB SERPL: 1.8 {RATIO} (ref 1.1–2.2)
ALP SERPL-CCNC: 86 U/L (ref 40–129)
ALT SERPL-CCNC: 25 U/L (ref 10–40)
ANION GAP SERPL CALCULATED.3IONS-SCNC: 11 MMOL/L (ref 3–16)
AST SERPL-CCNC: 23 U/L (ref 15–37)
BASOPHILS # BLD: 0 K/UL (ref 0–0.2)
BASOPHILS NFR BLD: 0.7 %
BILIRUB SERPL-MCNC: <0.2 MG/DL (ref 0–1)
BUN SERPL-MCNC: 15 MG/DL (ref 7–20)
CALCIUM SERPL-MCNC: 9.7 MG/DL (ref 8.3–10.6)
CHLORIDE SERPL-SCNC: 105 MMOL/L (ref 99–110)
CHOLEST SERPL-MCNC: 218 MG/DL (ref 0–199)
CO2 SERPL-SCNC: 24 MMOL/L (ref 21–32)
CREAT SERPL-MCNC: 0.6 MG/DL (ref 0.6–1.1)
DEPRECATED RDW RBC AUTO: 13.8 % (ref 12.4–15.4)
EOSINOPHIL # BLD: 0.1 K/UL (ref 0–0.6)
EOSINOPHIL NFR BLD: 2.8 %
EST. AVERAGE GLUCOSE BLD GHB EST-MCNC: 111.2 MG/DL
GFR SERPLBLD CREATININE-BSD FMLA CKD-EPI: >90 ML/MIN/{1.73_M2}
GLUCOSE SERPL-MCNC: 95 MG/DL (ref 70–99)
HBA1C MFR BLD: 5.5 %
HCT VFR BLD AUTO: 39.2 % (ref 36–48)
HDLC SERPL-MCNC: 54 MG/DL (ref 40–60)
HGB BLD-MCNC: 13.5 G/DL (ref 12–16)
LDL CHOLESTEROL: 134 MG/DL
LYMPHOCYTES # BLD: 2 K/UL (ref 1–5.1)
LYMPHOCYTES NFR BLD: 42.6 %
MCH RBC QN AUTO: 31.2 PG (ref 26–34)
MCHC RBC AUTO-ENTMCNC: 34.6 G/DL (ref 31–36)
MCV RBC AUTO: 90.1 FL (ref 80–100)
MONOCYTES # BLD: 0.3 K/UL (ref 0–1.3)
MONOCYTES NFR BLD: 6.8 %
NEUTROPHILS # BLD: 2.2 K/UL (ref 1.7–7.7)
NEUTROPHILS NFR BLD: 47.1 %
PLATELET # BLD AUTO: 184 K/UL (ref 135–450)
PMV BLD AUTO: 10 FL (ref 5–10.5)
POTASSIUM SERPL-SCNC: 4 MMOL/L (ref 3.5–5.1)
PROT SERPL-MCNC: 6.8 G/DL (ref 6.4–8.2)
RBC # BLD AUTO: 4.34 M/UL (ref 4–5.2)
SODIUM SERPL-SCNC: 140 MMOL/L (ref 136–145)
TRIGL SERPL-MCNC: 150 MG/DL (ref 0–150)
VLDLC SERPL CALC-MCNC: 30 MG/DL
WBC # BLD AUTO: 4.6 K/UL (ref 4–11)

## 2024-11-01 ENCOUNTER — TELEPHONE (OUTPATIENT)
Dept: PRIMARY CARE CLINIC | Age: 44
End: 2024-11-01

## 2024-11-01 LAB
HPV HR 12 DNA SPEC QL NAA+PROBE: NOT DETECTED
HPV16 DNA SPEC QL NAA+PROBE: NOT DETECTED
HPV16+18+H RISK 12 DNA SPEC-IMP: NORMAL
HPV18 DNA SPEC QL NAA+PROBE: NOT DETECTED

## 2024-11-01 NOTE — TELEPHONE ENCOUNTER
Pt returning missed call. Advised of lab results per chart notes. Pt understands and has no further questions. Will await results for PAP.

## 2025-01-11 ENCOUNTER — HOSPITAL ENCOUNTER (EMERGENCY)
Age: 45
Discharge: HOME | End: 2025-01-11
Payer: COMMERCIAL

## 2025-01-11 ENCOUNTER — APPOINTMENT (OUTPATIENT)
Dept: GENERAL RADIOLOGY | Age: 45
End: 2025-01-11
Payer: COMMERCIAL

## 2025-01-11 VITALS
TEMPERATURE: 98.1 F | WEIGHT: 140 LBS | HEART RATE: 78 BPM | DIASTOLIC BLOOD PRESSURE: 72 MMHG | RESPIRATION RATE: 16 BRPM | SYSTOLIC BLOOD PRESSURE: 118 MMHG | HEIGHT: 57 IN | OXYGEN SATURATION: 96 % | BODY MASS INDEX: 30.2 KG/M2

## 2025-01-11 DIAGNOSIS — J10.1 INFLUENZA A: Primary | ICD-10-CM

## 2025-01-11 DIAGNOSIS — J20.9 ACUTE BRONCHITIS, UNSPECIFIED ORGANISM: ICD-10-CM

## 2025-01-11 LAB
FLUAV RNA RESP QL NAA+PROBE: DETECTED
FLUBV RNA RESP QL NAA+PROBE: NOT DETECTED
S PYO AG THROAT QL: NEGATIVE
SARS-COV-2 RNA RESP QL NAA+PROBE: NOT DETECTED

## 2025-01-11 PROCEDURE — 71046 X-RAY EXAM CHEST 2 VIEWS: CPT

## 2025-01-11 PROCEDURE — 87636 SARSCOV2 & INF A&B AMP PRB: CPT

## 2025-01-11 PROCEDURE — 6370000000 HC RX 637 (ALT 250 FOR IP): Performed by: PHYSICIAN ASSISTANT

## 2025-01-11 PROCEDURE — 94640 AIRWAY INHALATION TREATMENT: CPT

## 2025-01-11 PROCEDURE — 87880 STREP A ASSAY W/OPTIC: CPT

## 2025-01-11 PROCEDURE — 99284 EMERGENCY DEPT VISIT MOD MDM: CPT

## 2025-01-11 PROCEDURE — 94761 N-INVAS EAR/PLS OXIMETRY MLT: CPT

## 2025-01-11 RX ORDER — ACETAMINOPHEN 500 MG
500 TABLET ORAL 4 TIMES DAILY PRN
Qty: 20 TABLET | Refills: 0 | Status: SHIPPED | OUTPATIENT
Start: 2025-01-11

## 2025-01-11 RX ORDER — ACETAMINOPHEN 500 MG
500 TABLET ORAL ONCE
Status: COMPLETED | OUTPATIENT
Start: 2025-01-11 | End: 2025-01-11

## 2025-01-11 RX ORDER — ALBUTEROL SULFATE 90 UG/1
2 INHALANT RESPIRATORY (INHALATION) 4 TIMES DAILY PRN
Qty: 54 G | Refills: 1 | Status: SHIPPED | OUTPATIENT
Start: 2025-01-11

## 2025-01-11 RX ORDER — OSELTAMIVIR PHOSPHATE 75 MG/1
75 CAPSULE ORAL 2 TIMES DAILY
Qty: 10 CAPSULE | Refills: 0 | Status: SHIPPED | OUTPATIENT
Start: 2025-01-11 | End: 2025-01-16

## 2025-01-11 RX ORDER — IBUPROFEN 200 MG
200 TABLET ORAL EVERY 6 HOURS PRN
COMMUNITY

## 2025-01-11 RX ORDER — PREDNISONE 20 MG/1
60 TABLET ORAL ONCE
Status: COMPLETED | OUTPATIENT
Start: 2025-01-11 | End: 2025-01-11

## 2025-01-11 RX ORDER — IPRATROPIUM BROMIDE AND ALBUTEROL SULFATE 2.5; .5 MG/3ML; MG/3ML
1 SOLUTION RESPIRATORY (INHALATION) ONCE
Status: COMPLETED | OUTPATIENT
Start: 2025-01-11 | End: 2025-01-11

## 2025-01-11 RX ORDER — PREDNISONE 10 MG/1
60 TABLET ORAL DAILY
Qty: 30 TABLET | Refills: 0 | Status: SHIPPED | OUTPATIENT
Start: 2025-01-11 | End: 2025-01-16

## 2025-01-11 RX ORDER — ACETAMINOPHEN 500 MG
500 TABLET ORAL EVERY 6 HOURS PRN
COMMUNITY
End: 2025-01-11

## 2025-01-11 RX ADMIN — PREDNISONE 60 MG: 20 TABLET ORAL at 13:18

## 2025-01-11 RX ADMIN — ACETAMINOPHEN 500 MG: 500 TABLET ORAL at 13:19

## 2025-01-11 RX ADMIN — IPRATROPIUM BROMIDE AND ALBUTEROL SULFATE 1 DOSE: .5; 3 SOLUTION RESPIRATORY (INHALATION) at 13:39

## 2025-01-11 ASSESSMENT — ENCOUNTER SYMPTOMS
COLOR CHANGE: 0
EYE PAIN: 0
STRIDOR: 0
VOICE CHANGE: 0
EYE REDNESS: 0
TROUBLE SWALLOWING: 0
NAUSEA: 0
SORE THROAT: 1
EYE ITCHING: 0
CONSTIPATION: 0
ABDOMINAL PAIN: 0
SHORTNESS OF BREATH: 1
EYE DISCHARGE: 0
VOMITING: 0
WHEEZING: 1
COUGH: 1
DIARRHEA: 0
BACK PAIN: 0
PHOTOPHOBIA: 0

## 2025-01-11 ASSESSMENT — LIFESTYLE VARIABLES
HOW OFTEN DO YOU HAVE A DRINK CONTAINING ALCOHOL: NEVER
HOW MANY STANDARD DRINKS CONTAINING ALCOHOL DO YOU HAVE ON A TYPICAL DAY: PATIENT DOES NOT DRINK

## 2025-01-11 ASSESSMENT — PAIN SCALES - GENERAL: PAINLEVEL_OUTOF10: 4

## 2025-01-11 NOTE — ED PROVIDER NOTES
epidural hematoma, cerebellar compromise, posterior stroke, bells palsy, TMJ syndrome, trigeminal neuralgia, dental abscess, Wallace angina, otitis, acute strep pharyngitis, peritonsillar or tonsillar abscess, retropharyngeal abscess, bacterial tracheitis, epiglottitis, meningitis, encephalitis, foreign body, angioedema, anaphylaxis, mononucleosis, mumps, lymphoma, leukemia, asthma exacerbation, osteomyelitis, mastoiditis, sepsis, DKA, sialadenitis, parotiditis, trench mouth, shingles, or other concerning pathology.      Appropriate for outpatient management        I am the Primary Clinician of Record.    FINAL IMPRESSION      1. Influenza A    2. Acute bronchitis, unspecified organism          DISPOSITION/PLAN     DISPOSITION Decision To Discharge 01/11/2025 01:55:07 PM   DISPOSITION CONDITION Stable           PATIENT REFERRED TO:  Reyna Haley MD  [address] [facility] [address]  [phone]          [facility] Emergency Department  [address]  [place] [address]  [phone]    If symptoms worsen      DISCHARGE MEDICATIONS:  New Prescriptions    ACETAMINOPHEN (TYLENOL) 500 MG TABLET    Take 1 tablet by mouth 4 times daily as needed for Pain    ALBUTEROL SULFATE HFA (VENTOLIN HFA) 108 (90 BASE) MCG/ACT INHALER    Inhale 2 puffs into the lungs 4 times daily as needed for Wheezing    OSELTAMIVIR (TAMIFLU) 75 MG CAPSULE    Take 1 capsule by mouth 2 times daily for 5 days    PREDNISONE (DELTASONE) 10 MG TABLET    Take 6 tablets by mouth daily for 5 doses       DISCONTINUED MEDICATIONS:  Discontinued Medications    ACETAMINOPHEN (TYLENOL) 500 MG TABLET    Take 1 tablet by mouth every 6 hours as needed for Pain              (Please note that portions of this note were completed with a voice recognition program.  Efforts were made to edit the dictations but occasionally words are mis-transcribed.)    Osito Lanier PA-C (electronically signed)            Osito Lanier,  [place]  01/11/25 1354